# Patient Record
Sex: FEMALE | Race: OTHER
[De-identification: names, ages, dates, MRNs, and addresses within clinical notes are randomized per-mention and may not be internally consistent; named-entity substitution may affect disease eponyms.]

---

## 2017-06-06 ENCOUNTER — HOSPITAL ENCOUNTER (EMERGENCY)
Dept: HOSPITAL 59 - ER | Age: 57
Discharge: HOME | End: 2017-06-06
Payer: MEDICARE

## 2017-06-06 DIAGNOSIS — R51: Primary | ICD-10-CM

## 2017-06-06 DIAGNOSIS — E05.00: ICD-10-CM

## 2017-06-06 DIAGNOSIS — R68.83: ICD-10-CM

## 2017-06-06 DIAGNOSIS — Z86.19: ICD-10-CM

## 2017-06-06 DIAGNOSIS — Z11.59: ICD-10-CM

## 2017-06-06 DIAGNOSIS — Z00.00: ICD-10-CM

## 2017-06-06 DIAGNOSIS — R05: ICD-10-CM

## 2017-06-06 DIAGNOSIS — Z13.1: ICD-10-CM

## 2017-06-06 DIAGNOSIS — Z13.220: ICD-10-CM

## 2017-06-06 LAB
ALBUMIN SERPL-MCNC: 4.6 GM/DL (ref 3.5–5)
ALBUMIN/GLOB SERPL: 1.2 {RATIO} (ref 1.1–1.8)
ALP SERPL-CCNC: 164 U/L (ref 38–126)
ALT SERPL-CCNC: 58 U/L (ref 9–52)
ANION GAP SERPL CALC-SCNC: 5.7 MMOL/L (ref 7–16)
AST SERPL-CCNC: 58 U/L (ref 14–36)
BASOPHILS NFR BLD: 0.1 % (ref 0–6)
BILIRUB SERPL-MCNC: 0.81 MG/DL (ref 0.2–1.3)
BUN SERPL-MCNC: 10 MG/DL (ref 7–17)
CO2 SERPL-SCNC: 24.3 MMOL/L (ref 22–30)
CREAT SERPL-MCNC: 0.7 MG/DL (ref 0.52–1.04)
CRP SERPL-MCNC: < 0.5 MG/DL (ref 0–0.9)
EOSINOPHIL NFR BLD: 0.5 % (ref 0–6)
ERYTHROCYTE [DISTWIDTH] IN BLOOD BY AUTOMATED COUNT: 13.6 % (ref 11.5–14.5)
EST GLOMERULAR FILTRATION RATE: > 60 ML/MIN
GLOBULIN SER-MCNC: 4 GM/DL (ref 1.4–4.8)
GLUCOSE SERPL-MCNC: 114 MG/DL (ref 70–110)
GRANULOCYTES NFR BLD: 73.3 % (ref 47–80)
HCT VFR BLD CALC: 43.5 % (ref 35–47)
HGB BLD-MCNC: 15.2 GM/DL (ref 11.6–16)
LYMPHOCYTES NFR BLD AUTO: 19.2 % (ref 16–45)
MCH RBC QN AUTO: 29.9 PG (ref 27–33)
MCHC RBC AUTO-ENTMCNC: 34.9 G/DL (ref 32–36)
MCV RBC AUTO: 85.5 FL (ref 81–97)
MONOCYTES NFR BLD: 6.9 % (ref 0–9)
PLATELET # BLD: 154 K/UL (ref 130–400)
PMV BLD AUTO: 10.7 FL (ref 7.4–10.4)
PROT SERPL-MCNC: 8.6 GM/DL (ref 6.3–8.2)
RBC # BLD AUTO: 5.09 M/UL (ref 3.8–5.4)
WBC # BLD AUTO: 8 K/UL (ref 4.2–12.2)

## 2017-06-06 PROCEDURE — 99283 EMERGENCY DEPT VISIT LOW MDM: CPT

## 2017-06-06 PROCEDURE — 99284 EMERGENCY DEPT VISIT MOD MDM: CPT

## 2017-06-06 PROCEDURE — 85025 COMPLETE CBC W/AUTO DIFF WBC: CPT

## 2017-06-06 PROCEDURE — 96374 THER/PROPH/DIAG INJ IV PUSH: CPT

## 2017-06-06 PROCEDURE — 70450 CT HEAD/BRAIN W/O DYE: CPT

## 2017-06-06 PROCEDURE — 80053 COMPREHEN METABOLIC PANEL: CPT

## 2017-06-06 PROCEDURE — 86140 C-REACTIVE PROTEIN: CPT

## 2017-06-06 PROCEDURE — 96375 TX/PRO/DX INJ NEW DRUG ADDON: CPT

## 2017-06-06 NOTE — EMERGENCY DEPARTMENT RECORD
History of Present Illness





- General


Chief Complaint: Headache Migraine


Stated Complaint: HA & BODYACHES


Time Seen by Provider: 17 10:45


Source: Patient


Mode of Arrival: Ambulatory


Limitations: No limitations





- History of Present Illness


Initial Comments: 





57 yo female presents [facility] for evaluation of 

headache and body aches that began last night.  Patient does report similar 

headache symptoms previously, denies nausea/vomiting symptoms.  Patient reports 

that her symptoms came on gradually, reports congestion, sinus pain, and sinus 

pressure as well.  Patient denies neck stiffness and denies anticoagulation use.


MD Complaint: Headache


Onset/Timin


-: Hour(s)


Onset Description: Gradual


Location: Frontal, Occipital


Severity: Moderate


Severity scale (1-10): 10


Quality: Aching


Consistency: Constant


Improves With: Nothing


Worsens With: None


Other Symptoms: Chest pain, Cough


Treatments Prior to Arrival: None





- Related Data


 Home Medications











 Medication  Instructions  Recorded  Confirmed  Last Taken


 


Levothyroxine Sodium [Synthroid] 150 mcg PO DAILY 07/28/15 06/06/17 06/04/17











 Allergies











Allergy/AdvReac Type Severity Reaction Status Date / Time


 


aspirin Allergy Unknown PT UNSURE Unverified 17 10:19





   OF REACTION  


 


Penicillins Allergy Unknown PT UNSURE Unverified 17 10:19





   OF REACTION  














Travel Screening





- Travel/Exposure Within Last 30 Days


Have you traveled within the last 30 days?: No





- Travel/Exposure Within Last Year


Have you traveled outside the [place] in the last year?: No





- Additonal Travel Details


Have you been exposed to anyone with a communicable illness?: No





- Travel Symptoms


Symptom Screening: None





Review of Systems


Constitutional: Denies: Chills, Malaise, Night sweats


Eyes: Denies: Eye discharge, Eye pain


ENT: Reports: Congestion.  Denies: Ear pain, Epistaxis


Respiratory: Reports: Cough.  Denies: Dyspnea


Cardiovascular: Denies: Chest pain, Dyspnea on exertion


Endocrine: Denies: Fatigue, Heat or cold intolerance


Gastrointestinal: Denies: Abdominal pain, Nausea, Vomiting


Genitourinary: Denies: Dysuria, Frequency


Musculoskeletal: Denies: Arthralgia, Back pain, Gout, Joint swelling


Skin: Denies: Bruising, Change in color


Neurological: Reports: Headache.  Denies: Abnormal gait, Confusion, Seizure


Psychiatric: Denies: Anxiety


Hematological/Lymphatic: Denies: Anemia, Blood Clots





Past Medical History





- SOCIAL HISTORY


Smoking Status: Former smoker


Alcohol Use: None


Drug Use: Occassional


Drug Use Detail:: Marijuana





- RESPIRATORY


Hx Respiratory Disorders: No





- CARDIOVASCULAR


Hx Cardio Disorders: Yes


Hx Abnormal EKG: Yes





- NEURO


Hx Neuro Disorders: No





- GI


Hx GI Disorders: No





- 


Hx Genitourinary Disorders: No





- ENDOCRINE


Hx Endocrine Disorders: Yes


Hx Thyroid Disease: Yes (removed)





- MUSCULOSKELETAL


Hx Musculoskeletal Disorders: No





- PSYCH


Hx Psych Problems: Yes


Hx Anxiety: Yes


Hx Depression: Yes





- HEMATOLOGY/ONCOLOGY


Hx Hematology/Oncology Disorders: No





Family Medical History


Any Significant Family History?: No


Hx Cancer: Father


Hx Diabetes: Mother


Hx Liver Disease: Mother





Physical Exam





- General


General Appearance: Alert, Oriented x3, Cooperative, Moderate distress (patient 

is tearful on examination, anxious appearing)


Limitations: No limitations





- Head


Head exam: Atraumatic, Normocephalic, Normal inspection


Head exam detail: negative: Abrasion, Contusion, Rosales's sign, General 

tenderness, Hematoma, Laceration





- Eye


Eye exam: Normal appearance.  negative: Conjunctival injection, Periorbital 

swelling, Periorbital tenderness, Scleral icterus





- ENT


Ear exam: negative: Auricular hematoma, Auricular trauma


Nasal Exam: negative: Active bleeding, Discharge, Dried blood, Foreign body


Mouth exam: negative: Drooling, Laceration, Muffled voice, Tongue elevation





- Neck


Neck exam: Normal inspection.  negative: Meningismus, Tenderness





- Respiratory


Respiratory exam: Normal lung sounds bilaterally.  negative: Rales, Respiratory 

distress, Rhonchi, Stridor





- Cardiovascular


Cardiovascular Exam: Regular rate, Normal rhythm, Normal heart sounds





- GI/Abdominal


GI/Abdominal exam: Soft.  negative: Rebound, Rigid, Tenderness





- Rectal


Rectal exam: Deferred





- 


 exam: Deferred





- Extremities


Extremities exam: Normal inspection.  negative: Calf tenderness, Pedal edema, 

Tenderness





- Back


Back exam: Denies: CVA tenderness (R), CVA tenderness (L)





- Neurological


Neurological exam: Alert, Normal gait, Oriented X3





- Psychiatric


Psychiatric exam: Normal affect, Normal mood





- Skin


Skin exam: Normal color.  negative: Abrasion


Type of lesion: negative: abrasion





Course





 Vital Signs











  17





  10:46


 


Temperature 98.0 F


 


Pulse Rate 92 H


 


Respiratory 18





Rate 


 


Blood Pressure 154/85


 


Pulse Ox 97














- Reevaluation(s)


Reevaluation #1: 





17 11:06


Will administer migraine therapy, obtain laboratory studies, and CT imaging of 

the brain and reassess.  Patient agrees with plan as discussed.


Reevaluation #2: 





17 11:51


Labs reviewed, CRP < 0.5, WBC normal, AST/ALT/Alk phos are minimally elevated 

and c/w hepatitis C history.


CT Brain: NO acute process.


Reevaluation #3: 





17 12:11


Patient reassessed and reports that her headache symptoms are down to "5/10 

from a 100/10".  Given the patient's rapid improvement with non-narcotic therapy

, lack of fever, and no meningeal signs, patient is in agreement that LP would 

not be of benefit.  Patient was instructed to return to ED for any worsening of 

her symptoms.





Medical Decision Making





- Lab Data


Result diagrams: 


 17 11:09





 17 11:09





Disposition


Disposition: Discharge


Clinical Impression: 


Headache


Qualifiers:


 Headache type: unspecified Headache chronicity pattern: acute headache 

Intractability: not intractable Qualified Code(s): R51 - Headache





Disposition: Home, Self-Care


Condition: (2) Stable


Instructions:  Acute Headache (ED)


Additional Instructions: 


Return to ED if your symptoms worsen or if you have any concerns.


Follow-up with your family doctor in 1-3 days as directed.


Forms:  Patient Portal Access


Time of Disposition: 12:14

## 2017-06-08 NOTE — CT SCAN REPORT
EXAM:  CT OF THE BRAIN



HISTORY:  HEADACHES.  



TECHNIQUE:  CT of the brain without contrast was obtained.  



Comparison:  Prior CT of the brain from 7/28/15.  



FINDINGS:  The globes are intact.  Mucosal thickening of the ethmoid air cells.
  No displaced or depressed skull fracture.  No intra or extraaxial hemorrhage.
  CT is limited for evaluation of acute infarct.  No CT evidence for large or 
territorial acute infarct.  No mass or midline shift.  



IMPRESSION:  

NEGATIVE FOR ACUTE INTRACRANIAL ABNORMALITY.  



JOB NUMBER:  [id number]
[facility]

## 2018-09-20 ENCOUNTER — HOSPITAL ENCOUNTER (EMERGENCY)
Dept: HOSPITAL 59 - ER | Age: 58
Discharge: HOME | End: 2018-09-20
Payer: MEDICARE

## 2018-09-20 DIAGNOSIS — R11.2: ICD-10-CM

## 2018-09-20 DIAGNOSIS — R06.02: ICD-10-CM

## 2018-09-20 DIAGNOSIS — R53.1: ICD-10-CM

## 2018-09-20 DIAGNOSIS — Z87.891: ICD-10-CM

## 2018-09-20 DIAGNOSIS — G44.40: Primary | ICD-10-CM

## 2018-09-20 DIAGNOSIS — T50.995A: ICD-10-CM

## 2018-09-20 DIAGNOSIS — R05: ICD-10-CM

## 2018-09-20 LAB
ALBUMIN SERPL-MCNC: 4 G/DL (ref 4–5)
ALP SERPL-CCNC: 107 U/L (ref 35–104)
ALT SERPL-CCNC: < 5 U/L (ref ?–33)
ANION GAP SERPL CALC-SCNC: 16 MMOL/L (ref 7–16)
AST SERPL-CCNC: 21 U/L (ref 10–35)
BASOPHILS NFR BLD: 0.2 % (ref 0–6)
BILIRUB DIRECT SERPL-MCNC: < 0.2 MG/DL (ref 0–0.3)
BILIRUB SERPL-MCNC: 0.5 MG/DL (ref 0.2–1)
BUN SERPL-MCNC: 9 MG/DL (ref 8–23)
CO2 SERPL-SCNC: 23 MMOL/L (ref 22–29)
CREAT SERPL-MCNC: 0.6 MG/DL (ref 0.5–0.9)
EOSINOPHIL NFR BLD: 1.2 % (ref 0–6)
ERYTHROCYTE [DISTWIDTH] IN BLOOD BY AUTOMATED COUNT: 12.8 % (ref 11.5–14.5)
EST GLOMERULAR FILTRATION RATE: > 60 ML/MIN
GLUCOSE SERPL-MCNC: 114 MG/DL (ref 74–109)
GRANULOCYTES NFR BLD: 59.2 % (ref 47–80)
HCT VFR BLD CALC: 40.1 % (ref 35–47)
HGB BLD-MCNC: 13.7 GM/DL (ref 11.6–16)
LYMPHOCYTES NFR BLD AUTO: 32.2 % (ref 16–45)
MCH RBC QN AUTO: 29.5 PG (ref 27–33)
MCHC RBC AUTO-ENTMCNC: 34.2 G/DL (ref 32–36)
MCV RBC AUTO: 86.2 FL (ref 81–97)
MONOCYTES NFR BLD: 7.2 % (ref 0–9)
PLATELET # BLD: 169 K/UL (ref 130–400)
PMV BLD AUTO: 10.4 FL (ref 7.4–10.4)
PROT SERPL-MCNC: 7.4 G/DL (ref 6.6–8.7)
RBC # BLD AUTO: 4.65 M/UL (ref 3.8–5.4)
WBC # BLD AUTO: 6.5 K/UL (ref 4.2–12.2)

## 2018-09-20 PROCEDURE — 96361 HYDRATE IV INFUSION ADD-ON: CPT

## 2018-09-20 PROCEDURE — 96375 TX/PRO/DX INJ NEW DRUG ADDON: CPT

## 2018-09-20 PROCEDURE — 96374 THER/PROPH/DIAG INJ IV PUSH: CPT

## 2018-09-20 PROCEDURE — 85025 COMPLETE CBC W/AUTO DIFF WBC: CPT

## 2018-09-20 PROCEDURE — 71046 X-RAY EXAM CHEST 2 VIEWS: CPT

## 2018-09-20 PROCEDURE — 99284 EMERGENCY DEPT VISIT MOD MDM: CPT

## 2018-09-20 PROCEDURE — 80076 HEPATIC FUNCTION PANEL: CPT

## 2018-09-20 PROCEDURE — 80048 BASIC METABOLIC PNL TOTAL CA: CPT

## 2018-09-20 NOTE — EMERGENCY DEPARTMENT RECORD
History of Present Illness





- General


Chief Complaint: Headache Migraine


Stated Complaint: HEADACHE


Time Seen by Provider: 18 07:09


Source: Patient


Mode of Arrival: Ambulatory


Limitations: No limitations





- History of Present Illness


Initial Comments: 


The patient is here due to a 3-4 day hx of not feeling well. She started 

Harvoni 5 days ago and then 4 days ago developed a mild frontal HA. Since the 

HA has gradually worsened and now has become much more severe. She has had 

nausea and some vomiting and also did feel SOB last night with a cough. The 

patient also feels like her gums are swollen. There has been no fever, chills, 

CP, AP, or visual changes. The patient has had similar HA's in the past and has 

been to the ER for them also.





MD Complaint: Headache


Onset/Timin


-: Days(s)


Onset Description: Gradual


Location: Facial, Frontal


Severity scale (1-10): 10


Quality: Aching


Consistency: Constant


Improves With: Nothing


Associated Symptoms: Other


Treatments Prior to Arrival: Ibuprofen





- Related Data


 Home Medications











 Medication  Instructions  Recorded  Confirmed  Last Taken


 


Ledipasvir/Sofosbuvir [Harvoni 1 each PO DAILY 18





 mg Tablet]    








 Previous Rx's











 Medication  Instructions  Recorded


 


Ondansetron [Zofran Odt] 4 mg SL .Q4-6H PRN #12 tab.rapdis 18











 Allergies











Allergy/AdvReac Type Severity Reaction Status Date / Time


 


aspirin Allergy Unknown PT UNSURE Unverified 18 10:10





   OF REACTION  


 


Penicillins Allergy Unknown PT UNSURE Unverified 18 10:10





   OF REACTION  














Travel Screening





- Travel/Exposure Within Last 30 Days


Have you traveled within the last 30 days?: No





- Travel/Exposure Within Last Year


Have you traveled outside the [place] in the last year?: No





- Additonal Travel Details


Have you been exposed to anyone with a communicable illness?: No





- Travel Symptoms


Symptom Screening: None





Review of Systems


Constitutional: Reports: Malaise.  Denies: Chills, Fever


Eyes: Denies: Eye discharge


ENT: Denies: Congestion


Respiratory: Denies: Cough, Dyspnea


Cardiovascular: Denies: Arrhythmia, Chest pain


Endocrine: Reports: Fatigue


Gastrointestinal: Reports: Nausea


Genitourinary: Denies: Dysuria


Musculoskeletal: Denies: Arthralgia





Past Medical History





- SOCIAL HISTORY


Smoking Status: Former smoker


Alcohol Use: None


Drug Use: Rare


Drug Use Detail:: Marijuana





- RESPIRATORY


Hx Respiratory Disorders: Yes


Hx COPD: Yes





- CARDIOVASCULAR


Hx Cardio Disorders: No


Hx Abnormal EKG: Yes





- NEURO


Hx Neuro Disorders: Yes


Hx Headaches: Yes





- GI


Hx GI Disorders: Yes


Hx Hepatitis/Jaundice: Yes (Hep C)





- 


Hx Genitourinary Disorders: No





- ENDOCRINE


Hx Endocrine Disorders: Yes


Hx Thyroid Disease: Yes (removed)





- MUSCULOSKELETAL


Hx Musculoskeletal Disorders: Yes


Hx Osteoporosis: Yes





- PSYCH


Hx Psych Problems: Yes


Hx Anxiety: Yes


Hx Depression: Yes





- HEMATOLOGY/ONCOLOGY


Hx Hematology/Oncology Disorders: No





Family Medical History


Any Significant Family History?: No


Hx Cancer: Father


Hx Diabetes: Mother


Hx Liver Disease: Mother





Physical Exam





- General


General Appearance: Alert, Oriented x3, Cooperative, No acute distress





- Head


Head exam: Atraumatic, Normocephalic, Normal inspection





- Eye


Eye exam: Normal appearance, PERRL, EOMI





- ENT


Throat exam: Normal inspection.  negative: Tonsillar erythema, Tonsillar exudate





- Neck


Neck exam: Normal inspection, Full ROM.  negative: Meningismus (The neck is 

very supple.), Tenderness





- Respiratory


Respiratory exam: Normal lung sounds bilaterally.  negative: Respiratory 

distress





- Cardiovascular


Cardiovascular Exam: Regular rate, Normal rhythm, Normal heart sounds





- GI/Abdominal


GI/Abdominal exam: Soft, Normal bowel sounds.  negative: Tenderness





- Extremities


Extremities exam: Normal inspection, Full ROM, Normal capillary refill.  

negative: Tenderness





- Neurological


Neurological exam: Alert, Normal gait, Oriented X3.  negative: Abnormal gait, 

Motor sensory deficit





Course





 Vital Signs











  18





  07:08


 


Temperature 98.4 F


 


Pulse Rate [ 92 H





Pulse Ox Probe] 


 


Respiratory 24





Rate 


 


Blood Pressure 144/95





[Left Arm] 


 


Pulse Ox 97














- Reevaluation(s)


Reevaluation #1: 


The patient is doing a lot better at this time and her HA has resolved. I did 

discuss what I feel is the cause of her symptoms and feel very strongly that it 

is the Harvoni. The first 4 common side effects of Harvoni are weakness, 

headache, nausea and cough and the patient is here due to all 4 of those 

issues. She is to stop the medicine and call her specialist today.


18 08:24








Medical Decision Making





- Data Complexity


MDM Data: Labs Ordered and/or Reviewed, X-Ray Ordered and/or Reviewed





- Lab Data


Result diagrams: 


 18 07:35





 18 07:35





- Radiology Data


Radiology results: Report reviewed (CXR: Neg)





Disposition


Disposition: Discharge


Clinical Impression: 


 Medication side effects





Disposition: Home, Self-Care


Condition: (2) Stable


Instructions:  Acute Headache (ED)


Additional Instructions: 


Please stop the Harvoni and use Zofran for nausea. Please call your Specialist 

today for further instructions regarding the medicines. Return to the ER for 

any worsening symptoms.


Prescriptions: 


Ondansetron [Zofran Odt] 4 mg SL .Q4-6H PRN #12 tab.rapdis


 PRN Reason: Nausea


Forms:  Patient Portal Access


Time of Disposition: 08:27





Quality





- Quality Measures


Quality Measures: N/A





- Blood Pressure Screening


View Details: Yes


Does Patient Have Any of the Following: No


Blood Pressure Classification: Pre-Hypertensive BP Reading


Systolic Measurement: 117


Diastolic Measurement: 80


Screening for High Blood Pressure: < Pre-Hypertensive BP, F/U Documented > [

]


Pre-Hypertensive Follow-up Interventions: Referral to alternative/primary care 

provider.

## 2018-09-22 NOTE — RADIOLOGY REPORT
EXAM:  CHEST, TWO VIEWS



HISTORY:  COUGH.  



TECHNIQUE:  Two views of the chest were obtained.  



Comparison:  Two view chest radiographic examination dated 10/17/16.  



FINDINGS:  The heart is not enlarged and the pulmonary vasculature is 
nondilated.  The lungs and pleural spaces are grossly clear with the exception 
of minimal biapical pleural and parenchymal scarring.  There are mild 
degenerative end plate changes scattered within the visualized spine.  



IMPRESSION:  

 NO RADIOGRAPHIC EVIDENCE OF ACUTE CARDIOPULMONARY DISEASE WITHOUT SIGNIFICANT 
CHANGE SINCE 2/17/06.  



JOB NUMBER:  [id number]
[facility]

## 2019-01-01 ENCOUNTER — HOSPITAL ENCOUNTER (INPATIENT)
Dept: HOSPITAL 59 - ER | Age: 59
LOS: 2 days | Discharge: HOME | End: 2019-01-03
Attending: INTERNAL MEDICINE | Admitting: INTERNAL MEDICINE
Payer: MEDICARE

## 2019-01-01 DIAGNOSIS — B19.20: ICD-10-CM

## 2019-01-01 DIAGNOSIS — Z90.89: ICD-10-CM
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
## 2019-01-01 DIAGNOSIS — R55: Primary | ICD-10-CM

## 2019-01-01 DIAGNOSIS — S70.12XA: ICD-10-CM

## 2019-01-01 DIAGNOSIS — J44.9: ICD-10-CM

## 2019-01-01 DIAGNOSIS — E03.9: ICD-10-CM

## 2019-01-01 DIAGNOSIS — Z87.891: ICD-10-CM

## 2019-01-01 DIAGNOSIS — S00.93XA: ICD-10-CM

## 2019-01-01 DIAGNOSIS — S40.022A: ICD-10-CM

## 2019-01-01 LAB
ALBUMIN SERPL-MCNC: 3.8 G/DL (ref 4–5)
ALP SERPL-CCNC: 103 U/L (ref 35–104)
ALT SERPL-CCNC: 18 U/L (ref ?–33)
APPEARANCE UR: CLEAR
AST SERPL-CCNC: 23 U/L (ref 10–35)
BARBITURATE SCREEN URINE: NOT DETECTED
BENZODIAZEPINE SCREEN URINE: NOT DETECTED
BILIRUB DIRECT SERPL-MCNC: < 0.2 MG/DL (ref 0–0.3)
BILIRUB SERPL-MCNC: 0.4 MG/DL (ref 0.2–1)
BILIRUB UR-MCNC: NEGATIVE MG/DL
CARDIOLIPIN IGM SER IA-ACNC: NOT DETECTED
CARDIOLIPIN IGM SER IA-ACNC: NOT DETECTED
COLOR UR: YELLOW
EOSINOPHIL NFR BLD: 1 % (ref 0–6)
ERYTHROCYTE [DISTWIDTH] IN BLOOD BY AUTOMATED COUNT: 12.8 % (ref 11.5–14.5)
GLUCOSE UR STRIP-MCNC: NEGATIVE MG/DL
HCT VFR BLD CALC: 39.6 % (ref 35–47)
HGB BLD-MCNC: 13.4 GM/DL (ref 11.6–16)
KETONES UR QL STRIP: NEGATIVE
LYMPHOCYTES NFR BLD: 56 % (ref 16–45)
MCH RBC QN AUTO: 28.7 PG (ref 27–33)
MCHC RBC AUTO-ENTMCNC: 33.8 G/DL (ref 32–36)
MCV RBC AUTO: 84.8 FL (ref 81–97)
METHADONE SCREEN URINE: NOT DETECTED
MONOCYTES NFR BLD: 6 % (ref 0–9)
NEUTROPHILS NFR BLD AUTO: 37 % (ref 47–80)
NITRITE UR QL STRIP: NEGATIVE
OPIATE SCREEN URINE: NOT DETECTED
PF4 HEPARIN CMPLX AB SER-ACNC: NOT DETECTED
PF4 HEPARIN CMPLX AB SER-ACNC: NOT DETECTED
PHENCYCLIDINE SCREEN URINE: NOT DETECTED
PLATELET # BLD EST: NORMAL 10*3/UL
PLATELET # BLD: 198 K/UL (ref 130–400)
PMV BLD AUTO: 11 FL (ref 7.4–10.4)
PROPOXYPHENE SCREEN URINE: NOT DETECTED
PROT SERPL-MCNC: 7.3 G/DL (ref 6.6–8.7)
PROT UR QL STRIP: NEGATIVE
RBC # BLD AUTO: 4.67 M/UL (ref 3.8–5.4)
RBC # UR STRIP: NEGATIVE /UL
RBC MORPH BLD: NORMAL
THC SCREEN URINE: NOT DETECTED
TRICYCLIC ANTIDEPRESSANT SCRN: NOT DETECTED
URINE LEUKOCYTE ESTERASE: NEGATIVE
UROBILINOGEN UR STRIP-ACNC: 1 E.U./DL (ref 0.2–1)
WBC # BLD AUTO: 6.6 K/UL (ref 4.2–12.2)

## 2019-01-01 PROCEDURE — 80305 DRUG TEST PRSMV DIR OPT OBS: CPT

## 2019-01-01 PROCEDURE — 84443 ASSAY THYROID STIM HORMONE: CPT

## 2019-01-01 PROCEDURE — 73030 X-RAY EXAM OF SHOULDER: CPT

## 2019-01-01 PROCEDURE — 99239 HOSP IP/OBS DSCHRG MGMT >30: CPT

## 2019-01-01 PROCEDURE — 90732 PPSV23 VACC 2 YRS+ SUBQ/IM: CPT

## 2019-01-01 PROCEDURE — 85027 COMPLETE CBC AUTOMATED: CPT

## 2019-01-01 PROCEDURE — 80048 BASIC METABOLIC PNL TOTAL CA: CPT

## 2019-01-01 PROCEDURE — 82948 REAGENT STRIP/BLOOD GLUCOSE: CPT

## 2019-01-01 PROCEDURE — 99285 EMERGENCY DEPT VISIT HI MDM: CPT

## 2019-01-01 PROCEDURE — 72125 CT NECK SPINE W/O DYE: CPT

## 2019-01-01 PROCEDURE — 84436 ASSAY OF TOTAL THYROXINE: CPT

## 2019-01-01 PROCEDURE — 84484 ASSAY OF TROPONIN QUANT: CPT

## 2019-01-01 PROCEDURE — 96374 THER/PROPH/DIAG INJ IV PUSH: CPT

## 2019-01-01 PROCEDURE — 36416 COLLJ CAPILLARY BLOOD SPEC: CPT

## 2019-01-01 PROCEDURE — 85025 COMPLETE CBC W/AUTO DIFF WBC: CPT

## 2019-01-01 PROCEDURE — 84480 ASSAY TRIIODOTHYRONINE (T3): CPT

## 2019-01-01 PROCEDURE — 94640 AIRWAY INHALATION TREATMENT: CPT

## 2019-01-01 PROCEDURE — 99223 1ST HOSP IP/OBS HIGH 75: CPT

## 2019-01-01 PROCEDURE — 80076 HEPATIC FUNCTION PANEL: CPT

## 2019-01-01 PROCEDURE — 70450 CT HEAD/BRAIN W/O DYE: CPT

## 2019-01-01 PROCEDURE — 73552 X-RAY EXAM OF FEMUR 2/>: CPT

## 2019-01-01 PROCEDURE — 71101 X-RAY EXAM UNILAT RIBS/CHEST: CPT

## 2019-01-01 PROCEDURE — 85379 FIBRIN DEGRADATION QUANT: CPT

## 2019-01-01 PROCEDURE — 93010 ELECTROCARDIOGRAM REPORT: CPT

## 2019-01-01 PROCEDURE — 93306 TTE W/DOPPLER COMPLETE: CPT

## 2019-01-01 PROCEDURE — 83605 ASSAY OF LACTIC ACID: CPT

## 2019-01-01 PROCEDURE — 93005 ELECTROCARDIOGRAM TRACING: CPT

## 2019-01-01 PROCEDURE — 93880 EXTRACRANIAL BILAT STUDY: CPT

## 2019-01-01 PROCEDURE — 80053 COMPREHEN METABOLIC PANEL: CPT

## 2019-01-01 PROCEDURE — 83036 HEMOGLOBIN GLYCOSYLATED A1C: CPT

## 2019-01-01 PROCEDURE — 81003 URINALYSIS AUTO W/O SCOPE: CPT

## 2019-01-01 RX ADMIN — ACETAMINOPHEN PRN MG: 325 TABLET, FILM COATED ORAL at 22:27

## 2019-01-01 NOTE — EMERGENCY DEPARTMENT RECORD
History of Present Illness





- General


Chief Complaint: Dizziness


Stated Complaint: FELL,DIZZY, SHAKY


Time Seen by Provider: 19 18:06


Source: Patient


Mode of Arrival: Ambulatory





- History of Present Illness


Initial Comments: 





The patient states that she had two episodes of syncope since last evening 

twice.  FIRST syncope she was lightheaded, layed down for a while, got up to go 

to bathroom and next thing she knew she  woke up on bathroom floor. She states 

she it her left head left shoulder and left hip/thigh and they are painful. 


SECOND time she had slept all night in bed, went home around 1100am today, ate 

toast and egg, and when she got up to to to bathroom she woke up again on the 

floor.  She denies other mproblems such as chest pain, Kelsey, abdominal pain 

nausea vomiting diarrhea, abdominal pain.


MD Complaint: Other (syncope)


Onset/Timin


-: Days(s)


Timing: Sudden onset, Waxing/waning


Description: Near-syncope


History of Same: Yes (related to thyroid issues )


History of Trauma: No


Severity: Moderate





- Liliana Coma Scale


Eye Response: (4) Open spontaneously


Motor Response: (6) Obeys commands


Verbal Response: (5) Oriented


[place] Total: 15





- Related Data


 Allergies











Allergy/AdvReac Type Severity Reaction Status Date / Time


 


aspirin Allergy Unknown PT UNSURE Verified 19 17:56





   OF REACTION  


 


Penicillins Allergy Unknown PT UNSURE Verified 19 17:56





   OF REACTION  














Travel Screening





- Travel/Exposure Within Last 30 Days


Have you traveled within the last 30 days?: No





- Travel/Exposure Within Last Year


Have you traveled outside the [place] in the last year?: No





- Additonal Travel Details


Have you been exposed to anyone with a communicable illness?: No





- Travel Symptoms


Symptom Screening: None





Review of Systems


Reviewed: No additional complaints except as noted below


Constitutional: Reports: As per HPI.  Denies: Chills, Fever, Malaise, Night 

sweats, Weakness, Weight change


Eyes: Reports: As per HPI.  Denies: Eye discharge, Eye pain, Photophobia, 

Vision change


ENT: Reports: As per HPI.  Denies: Congestion, Dental pain, Ear pain, Epistaxis

, Hearing loss, Throat pain


Respiratory: Reports: As per HPI.  Denies: Cough, Dyspnea, Hemoptysis, Stridor, 

Wheezes


Cardiovascular: Reports: As per HPI.  Denies: Arrhythmia, Chest pain, Dyspnea 

on exertion, Edema, Murmurs, Orthopnea, Palpitations, Paroxysmal nocturnal 

dyspnea, Rheumatic Fever, Syncope


Endocrine: Reports: As per HPI.  Denies: Fatigue, Heat or cold intolerance, 

Polydipsia, Polyuria


Gastrointestinal: Reports: As per HPI.  Denies: Abdominal pain, Constipation, 

Diarrhea, Hematemesis, Hematochezia, Melena, Nausea, Vomiting


Genitourinary: Reports: As per HPI.  Denies: Abnormal menses, Discharge, 

Dyspareunia, Dysuria, Frequency, Hematuria, Incontinence, Retention, Urgency


Musculoskeletal: Reports: As per HPI.  Denies: Arthralgia, Back pain, Gout, 

Joint swelling, Myalgia, Neck pain


Skin: Reports: As per HPI.  Denies: Bruising, Change in color, Change in hair/

nails, Lesions, Pruritus, Rash


Neurological: Reports: As per HPI.  Denies: Abnormal gait, Confusion, Headache, 

Numbness, Paresthesias, Seizure, Tingling, Tremors, Vertigo, Weakness


Psychiatric: Reports: As per HPI.  Denies: Anxiety, Auditory hallucinations, 

Depression, Homicidal thoughts, Suicidal thoughts, Visual hallucinations


Hematological/Lymphatic: Reports: As per HPI.  Denies: Anemia, Blood Clots, 

Easy bleeding, Easy bruising, Swollen glands





Past Medical History





- SOCIAL HISTORY


Smoking Status: Former smoker


Alcohol Use: None


Drug Use: None, Rare


Drug Use Detail:: Marijuana





- RESPIRATORY


Hx Respiratory Disorders: Yes


Hx COPD: Yes





- CARDIOVASCULAR


Hx Cardio Disorders: No


Hx Abnormal EKG: Yes


Comment:: low HR





- NEURO


Hx Neuro Disorders: Yes


Hx Headaches: Yes





- GI


Hx GI Disorders: Yes


Hx Hepatitis/Jaundice: Yes (Hep C)





- 


Hx Genitourinary Disorders: No





- ENDOCRINE


Hx Endocrine Disorders: Yes


Hx Thyroid Disease: Yes (removed)





- MUSCULOSKELETAL


Hx Musculoskeletal Disorders: Yes


Hx Osteoporosis: Yes





- PSYCH


Hx Psych Problems: Yes


Hx Anxiety: Yes


Hx Depression: Yes





- HEMATOLOGY/ONCOLOGY


Hx Hematology/Oncology Disorders: No





Family Medical History


Any Significant Family History?: Yes


Hx Cancer: Father


Hx Diabetes: Mother


Hx Liver Disease: Mother





Physical Exam





- General


General Appearance: Alert, Oriented x3, Cooperative, Mild distress





- Head


Head exam: Normal inspection


Head exam detail: Contusion (diffusely over left side of head)





- Eye


Eye exam: Normal appearance, PERRL, EOMI.  negative: Conjunctival injection, 

Nystagmus


Pupils: Normal accommodation





- ENT


ENT exam: Normal exam, Mucous membranes moist, Normal external ear exam, Normal 

orophraynx, TM's normal bilaterally


Ear exam: Normal external inspection.  negative: External canal tenderness


Nasal Exam: Normal inspection.  negative: Discharge, Sinus tenderness


Mouth exam: Normal external inspection, Tongue normal


Teeth exam: Normal inspection.  negative: Dental caries


Throat exam: Normal inspection.  negative: Tonsillar erythema, Tonsillar exudate





- Neck


Neck exam: Normal inspection, Full ROM.  negative: Lymphadenopathy, Meningismus

, Tenderness





- Respiratory


Respiratory exam: Normal lung sounds bilaterally.  negative: Respiratory 

distress





- Cardiovascular


Cardiovascular Exam: Regular rate, Normal rhythm, Normal heart sounds





- GI/Abdominal


GI/Abdominal exam: Soft, Normal bowel sounds.  negative: Tenderness





- Rectal


Rectal exam: Deferred





- 


 exam: Deferred





- Extremities


Extremities exam: Normal inspection, Full ROM, Normal capillary refill.  

negative: Calf tenderness, Pedal edema, Tenderness





- Back


Back exam: Reports: Normal inspection, Full ROM.  Denies: CVA tenderness (R), 

CVA tenderness (L), Muscle spasm, Rash noted, Tenderness





- Neurological


Neurological exam: Alert, CN II-XII intact, Normal gait, Oriented X3, Reflexes 

normal.  negative: Abnormal gait, Altered, Motor sensory deficit





- Psychiatric


Psychiatric exam: Normal affect, Normal mood





- Skin


Skin exam: Dry, Intact, Normal color, Warm





Course





 Vital Signs











  19





  17:47


 


Temperature 97.9 F


 


Pulse Rate 86


 


Respiratory 18





Rate 


 


Blood Pressure 121/78


 


Pulse Ox 98














- Reevaluation(s)


Reevaluation #1: 





19 21:25


Patis's test results discussed with her,  and she agrees to admission here for 

syncope workup.





Medical Decision Making





- Management Options


MDM Management: Additional Work-up Planned (e.g. ADM/Transfer/OP Study) (

Admission fo syncope)





- Data Complexity


MDM Data: Labs Ordered and/or Reviewed, X-Ray Ordered and/or Reviewed (All 

scans and xrays negative per radiologist.), EKG Ordered and/or Reviewed





- Lab Data


Result diagrams: 


 19 18:40








- EKG Data


-: EKG Interpreted by Me


EKG: No Acute Changes





Disposition


Forms:  Patient Portal Access





Quality





- Quality Measures


Quality Measures: N/A





- Blood Pressure Screening


Does Patient Have Any of the Following: No


Blood Pressure Classification: Pre-Hypertensive BP Reading


Systolic Measurement: 121


Diastolic Measurement: 78


Screening for High Blood Pressure: < Normal BP, F/U Not Required > []

## 2019-01-02 LAB
ALBUMIN SERPL-MCNC: 3.4 G/DL (ref 4–5)
ALBUMIN/GLOB SERPL: 1.1 {RATIO} (ref 1.1–1.8)
ALP SERPL-CCNC: 91 U/L (ref 35–104)
ALT SERPL-CCNC: 19 U/L (ref ?–33)
ANION GAP SERPL CALC-SCNC: 11 MMOL/L (ref 7–16)
AST SERPL-CCNC: 20 U/L (ref 10–35)
BILIRUB SERPL-MCNC: 0.5 MG/DL (ref 0.2–1)
BUN SERPL-MCNC: 13 MG/DL (ref 8–23)
CO2 SERPL-SCNC: 24 MMOL/L (ref 22–29)
CREAT SERPL-MCNC: 0.6 MG/DL (ref 0.5–0.9)
ERYTHROCYTE [DISTWIDTH] IN BLOOD BY AUTOMATED COUNT: 12.5 % (ref 11.5–14.5)
EST GLOMERULAR FILTRATION RATE: > 60 ML/MIN
GLOBULIN SER-MCNC: 3.2 GM/DL (ref 1.4–4.8)
GLUCOSE SERPL-MCNC: 109 MG/DL (ref 74–109)
HCT VFR BLD CALC: 37.9 % (ref 35–47)
HGB BLD-MCNC: 12.9 GM/DL (ref 11.6–16)
LYMPHOCYTES NFR BLD: 64 % (ref 16–45)
MCH RBC QN AUTO: 28.7 PG (ref 27–33)
MCHC RBC AUTO-ENTMCNC: 34 G/DL (ref 32–36)
MCV RBC AUTO: 84.2 FL (ref 81–97)
MONOCYTES NFR BLD: 11 % (ref 0–9)
NEUTROPHILS NFR BLD AUTO: 25 % (ref 47–80)
PLATELET # BLD EST: NORMAL 10*3/UL
PLATELET # BLD: 181 K/UL (ref 130–400)
PMV BLD AUTO: 10.3 FL (ref 7.4–10.4)
PROT SERPL-MCNC: 6.6 G/DL (ref 6.6–8.7)
RBC # BLD AUTO: 4.5 M/UL (ref 3.8–5.4)
RBC MORPH BLD: NORMAL
WBC # BLD AUTO: 5.1 K/UL (ref 4.2–12.2)

## 2019-01-02 RX ADMIN — ACETAMINOPHEN PRN MG: 325 TABLET, FILM COATED ORAL at 12:18

## 2019-01-02 RX ADMIN — ALBUTEROL SULFATE PRN PUFF: 90 AEROSOL, METERED RESPIRATORY (INHALATION) at 22:33

## 2019-01-02 RX ADMIN — ENOXAPARIN SODIUM SCH MG: 40 INJECTION SUBCUTANEOUS at 12:18

## 2019-01-02 RX ADMIN — ACETAMINOPHEN PRN MG: 325 TABLET, FILM COATED ORAL at 08:32

## 2019-01-02 RX ADMIN — ALBUTEROL SULFATE PRN PUFF: 90 AEROSOL, METERED RESPIRATORY (INHALATION) at 08:24

## 2019-01-02 RX ADMIN — DIPHENHYDRAMINE HYDROCHLORIDE PRN MG: 25 CAPSULE ORAL at 12:18

## 2019-01-02 NOTE — CT SCAN REPORT
EXAM:  CT SCAN OF THE CERVICAL SPINE WITHOUT CONTRAST 



HISTORY:  SYNCOPE AND FALL.  LEFT SIDED NECK PAIN.  



TECHNIQUE:  Standard CT imaging of the cervical spine was performed in the 
axial plane without contrast.  Additional coronal and sagittal reformatted 
images were also performed.  



Comparison:  None.  



Encounter:  Initial.



FINDINGS:  The patient is immobilized in a cervical collar.  There is normal 
cervical alignment.  The craniocervical and cervicothoracic junctions are 
normal.  There is no acute fracture, subluxation, or prevertebral soft tissue 
swelling.  There is very minor end plate degenerative change at the C5-C6 
level.  There is no central canal stenosis or significant neural foraminal 
narrowing.  The neck soft tissues and lung apices appear normal.  



IMPRESSION:  

NO ACUTE CERVICAL SPINE PATHOLOGY.  



JOB NUMBER:  [id number]
[facility]

## 2019-01-02 NOTE — CARDIOLOGY CONSULT
DATE: 01/02/2019



ADMISSION DATE: 01/01/2019



REASON FOR CONSULTATION: SYNCOPE.



HISTORY: Ms. Chavez is 58 years old, who presented to [facility] on 01/01/2019 for two episodes of syncope. She states the first episode 
occurred on 12/24/2018, when she felt dizzy and woke up on the bathroom floor. 
She reported no other significant complaints. After regaining consciousness, 
she did have one episode of emesis. The second episode occurred on 01/01/2019 
with, again, an episode of dizziness after breakfast. She denied angina, 
palpitations, TIA, or previous history of syncope. Her ECG on arrival 
demonstrated sinus rhythm with no ST/T abnormalities. Her troponins were 
negative during her hospitalization. Carotid ultrasound were ordered and 
demonstrated no significant bilateral carotid disease.  



PAST MEDICAL HISTORY: Includes hepatitis C, anxiety/depression, hypothyroidism, 
COPD. 



PAST SURGICAL HISTORY: She has no significant surgical history. 



MEDICATIONS AT HOME INCLUDE: Levothyroxine 225 mcg daily.



ALLERGIES: ASPIRIN. PENICILLIN. She states she may have hives from these 
medications.



SOCIAL HISTORY: She lives at home. She denies alcohol or tobacco use. 



PHYSICAL EXAM: 

GENERAL: She is afebrile. 

VITAL SIGNS: Blood pressure 118/57. Pulse 75. Respirations 16. 

LUNGS: Clear.

CARDIAC: Cardiac exam was normal.

ABDOMEN: Soft.

EXTREMITIES: Extremities reveal no edema. 



LABORATORY PROFILE: Her white count is 5.1. Hemoglobin 12.9. Platelets 181,000. 
Sodium 143. Potassium 3.6. BUN 13. Creatinine 0.6. Blood sugar 109. Her 
troponins have been negative. 



IMPRESSION/PLAN:



Ms. Chavez had two episodes of syncope that are likely vasovagal in nature. 
ECG and enzymes were negative.Carotid ultrasound was negative. Echocardiogram 
was performed today with an ejection fraction of 55% to 60% with mild mitral 
and tricuspid regurgitation. She can be discharged for outpatient follow-up. 



Thank you for this consultation.



JOB NUMBER:  [id number]
[facility]

## 2019-01-02 NOTE — RADIOLOGY REPORT
EXAM:  LEFT FEMUR



HISTORY:  LEFT FEMUR PAIN STATUS POST FALL.  



TECHNIQUE:  AP and lateral views of the left femur were obtained.  



Comparison:  None.  



Encounter:  Initial.



FINDINGS:  The bones and joints are normal in appearance.  There is no acute 
fracture or dislocation.  No focal soft tissue abnormality is identified.  



IMPRESSION:  

NEGATIVE LEFT FEMUR.  



JOB NUMBER:  [id number]
MTDD

## 2019-01-02 NOTE — RADIOLOGY REPORT
EXAM:  LEFT SHOULDER



HISTORY:  LEFT SHOULDER PAIN STATUS POST FALL.  



TECHNIQUE:  Three views of the left shoulder were obtained.  



Comparison:  None.  



FINDINGS:  The bones and joints are intact.  There is no acute fracture or 
dislocation.  There are minor arthritic changes of the acromioclavicular joint.
  



IMPRESSION:  

NO ACUTE LEFT SHOULDER PATHOLOGY.  



JOB NUMBER:  [id number]
MTDD

## 2019-01-02 NOTE — CT SCAN REPORT
EXAM:  CT SCAN OF THE BRAIN WITHOUT CONTRAST 



HISTORY:  SYNCOPE AND FALLS.  LEFT SIDED HEAD AND NECK PAIN.  



TECHNIQUE:  Standard CT imaging of the brain was performed in the axial plane 
without contrast.  



Comparison:  6/06/17.  



Encounter:  Initial.



FINDINGS:  The ventricles and subarachnoid spaces are normal.  There is no mass
, mass effect, intracranial hemorrhage, visible acute infarct, or abnormal 
extraaxial fluid.  The skull is intact.  The orbits, sinuses, and mastoids are 
normal.  



IMPRESSION:  

NO ACUTE INTRACRANIAL ABNORMALITY OR SKULL FRACTURE.  



JOB NUMBER:  [id number]
MTDD

## 2019-01-02 NOTE — RADIOLOGY REPORT
EXAM:  LEFT RIBS WITH PA CHEST



HISTORY:  LEFT RIB PAIN STATUS POST FALL.



TECHNIQUE:  An AP upright view of the chest and four views of the left ribs 
were obtained.  



Comparison:  Previous chest x-ray dated 9/20/18.  



FINDINGS:  The heart, mediastinum, and pulmonary vasculature are normal.  The 
lungs are clear.  There is no pneumothorax or effusion.  



The left ribs are normal in appearance.  There is no fracture or destructive 
process.  



IMPRESSION:  

NO ACUTE CHEST OR RIB PATHOLOGY IDENTIFIED.  



JOB NUMBER:  [id number]
[facility]

## 2019-01-02 NOTE — HISTORY & PHYSICAL
History of Present Illness





- Date of Service


Date of Service for History & Physical: 01/02/19





- History of Present Illness


Admitting Diagnosis: Syncope; multiple contusions lef shoulder, head, left leg


History of Present Illness: 


57 yo female c/o syncope x2. First episode was 12/24/18 when at home. Reports 

she was feeling dizzy and had her nephew assist her to the bathroom. Pt 

remembers splashing cold water on her face and then woke up on the bathroom 

floor. Was able to get her self off the floor and ambulate out of the bathroom, 

family assisted her to the bed. Pt reports vomiting when she got to bed, then 

laying down but did not sleep. Family later reported to her that she was not 

understanding parts of conversation. Pt was not taken to ER nor was EMS notified

, pt states everyone was intoxicated at the party but she was not drinking, was 

there for the food. Second episode was 1/1/19 with dizziness at home. Pt ate 

breakfast but still had dizziness. Pt is a poor historian and became very 

anxious during this interaction. PMH Hep C with recent tx completion, 

Hypothyroidism, Anx/Dep, COPD. 





1/1/19


Pt presented to ER for dizziness and episode of syncope. A&Ox3 upon arrival, 

denied CP, SOB, TAYE or ABD pain. 


/78, HR 86, RR18, 98% RA, 97.9F


WBC 6.6, Hgb 13.4, Hct 39.6, Plt 198


D Dimer 0.32


Tot Bili 0.40, Direct Bili <0.2, AST 23, ALT 18, Alk Phos 103, Trop <0.010, Tot 

protein 7.3, Albumin 3.8, lactic Acid 


EKG NSR, no acute issues


CT head and neck neg


Left shoulder, left femur XR neg, CXR neg





Admission for Syncope





1/2/19


Pt in no distress, a&ox3, neuro exam normal. Pt is poor historian but able to 

explain timeline well enough. US carotids ordered, repeat trops negative. Pt 

became anxious when speaking about recent family events of losing her mother 

and father has progressive CA. During this conversation, pt began to shake. 

Glucose 170 (postprandial), /85, HR 88, 97% RA. After stopping 

conversation about family stressors, pt calmed down and remained a&ox4. 


Labs reviewed and TSH ordered. TSH 0.01, T3T4 ordered. A1c 5.5


-Synthroid orders cancelled 


Awaiting Cardiology Consult and US results


POC if card clearance, PT eval and d/c tomorrow


F/U PCP to adjust synthroid








PCP Lupis Cortes ([place] [facility])                                               

                                                                               

                                                                               

                                                                               

                                                                               

                                                                               

                                                                               

                                                                               

                                                                               

                                                                               

                                                                               

                                                                               

                                                                               

                                                                               

                                                                               

                                                                               

                                                                               

                                                                               

                                                                               

                                                                               

                                                                               

                                                                               

                                                                               

                                                                               

                                                                               

                                                                               

                                                                               

                                                                               

                                                                               

                                                                               

                                                                               

                                                                               

                                                                               

                                                                               

                                                                               

                                                                               

                                                                               

                                                                               

                                                                               

                                                                               

                                                                               

                                                                               

                                                                               

                                                                               

                                                                               

                                                                               

                                                                               

                                                                               

                                                                               

                                                                               

                                                                               

                                                                               

                                                                               

                                                                               

                                                                               

                                                                               

                                                                               

                                                                               

                                                                               

                                                   





Travel Screening





- Travel/Exposure Within Last 30 Days


Have you traveled within the last 30 days?: No





- Travel/Exposure Within Last Year


Have you traveled outside the [place] in the last year?: No





- Additonal Travel Details


Have you been exposed to anyone with a communicable illness?: No





- Travel Symptoms


Symptom Screening: Lack of Appetite





Review of Systems


Constitutional: Reports: As per HPI.  Denies: Chills, Fever, Malaise, Night 

sweats, Weakness, Weight change


Eyes: Reports: As per HPI.  Denies: Eye discharge, Eye pain, Photophobia, 

Vision change


ENT: Reports: As per HPI.  Denies: Congestion, Dental pain, Ear pain, Epistaxis

, Hearing loss, Throat pain


Respiratory: Reports: As per HPI.  Denies: Cough, Dyspnea, Hemoptysis, Stridor, 

Wheezes


Cardiovascular: Reports: As per HPI.  Denies: Arrhythmia, Chest pain, Dyspnea 

on exertion, Edema, Murmurs, Orthopnea, Palpitations, Paroxysmal nocturnal 

dyspnea, Rheumatic Fever, Syncope


Endocrine: Reports: As per HPI.  Denies: Fatigue, Heat or cold intolerance, 

Polydipsia, Polyuria


Gastrointestinal: Reports: As per HPI, Constipation.  Denies: Abdominal pain, 

Diarrhea, Hematemesis, Hematochezia, Melena, Nausea, Vomiting


Genitourinary: Reports: As per HPI.  Denies: Abnormal menses, Discharge, 

Dyspareunia, Dysuria, Frequency, Hematuria, Incontinence, Retention, Urgency


Musculoskeletal: Reports: As per HPI.  Denies: Arthralgia, Back pain, Gout, 

Joint swelling, Myalgia, Neck pain


Skin: Reports: As per HPI, Bruising (since fall).  Denies: Change in color, 

Change in hair/nails, Lesions, Pruritus, Rash


Neurological: Reports: As per HPI.  Denies: Abnormal gait, Confusion, Headache, 

Numbness, Paresthesias, Seizure, Tingling, Tremors, Vertigo, Weakness


Psychiatric: Reports: As per HPI.  Denies: Anxiety, Auditory hallucinations, 

Depression, Homicidal thoughts, Suicidal thoughts, Visual hallucinations


Hematological/Lymphatic: Reports: As per HPI.  Denies: Anemia, Blood Clots, 

Easy bleeding, Easy bruising, Swollen glands





Past Medical History





- SOCIAL HISTORY


Smoking Status: Former smoker





- RESPIRATORY


Hx Respiratory Disorders: Yes


Hx COPD: Yes





- CARDIOVASCULAR


Hx Cardio Disorders: Yes


Hx Abnormal EKG: Yes


Comment:: low heart rate





- NEURO


Hx Neuro Disorders: Yes


Hx Headaches: Yes





- GI


Hx GI Disorders: Yes


Hx Hepatitis/Jaundice: Yes (Hx of hep C)





- 


Hx Genitourinary Disorders: No





- ENDOCRINE


Hx Endocrine Disorders: Yes


Hx Diabetes: No


Hx Thyroid Disease: Yes (removed 2010)





- MUSCULOSKELETAL


Hx Musculoskeletal Disorders: Yes


Hx Arthritis: Yes


Hx Osteoporosis: Yes





- PSYCH


Hx Psych Problems: Yes


Hx Anxiety: Yes


Hx Depression: Yes





- HEMATOLOGY/ONCOLOGY


Hx Hematology/Oncology Disorders: No





Family Medical History


Any Significant Family History?: Yes


Hx Cancer: Father


Hx Diabetes: Mother


Hx Liver Disease: Mother





H&P Meds/Allergies





- Allergies


Allergies: 


 Allergies











Allergy/AdvReac Type Severity Reaction Status Date / Time


 


aspirin Allergy Unknown PT UNSURE Verified 01/01/19 17:56





   OF REACTION  


 


Penicillins Allergy Unknown PT UNSURE Verified 01/01/19 17:56





   OF REACTION  














- Home Medications


 Home Medications











 Medication  Instructions  Recorded  Confirmed  Last Taken


 


Levothyroxine Sodium [Synthroid] 25 mcg PO DAILY 01/02/19 01/02/19 Unknown


 


Levothyroxine Sodium [Synthroid] 200 mcg PO DAILY 01/02/19 01/02/19 Unknown














- Active Medications


Active Medications: 


 Current Medications





Acetaminophen (Tylenol 325mg)  650 mg PO Q4H PRN


   PRN Reason: PAIN - MILD(1-4)/FEVER


   Last Admin: 01/02/19 08:32 Dose:  650 mg


Al Hydroxide/Mg Hydroxide (Maalox)  30 ml PO Q4H PRN


   PRN Reason: INDIGESTION


Albuterol Sulfate (Ventolin Hfa)  2 puff INH Q4H PRN


   PRN Reason: SHORTNESS OF BREATH


   Last Admin: 01/02/19 08:24 Dose:  2 puff


Temazepam (Restoril)  15 mg PO QHS PRN


   PRN Reason: INSOMNIA


   Last Admin: 01/01/19 22:26 Dose:  15 mg











Physical Exam





- Vital Signs


Vital Signs: 


 Vital Signs - Last 24 Hrs











  Temp Pulse Pulse Resp BP BP Pulse Ox


 


 01/02/19 09:06  97.9 F     94/55  


 


 01/02/19 08:20   72   16   


 


 01/02/19 05:45  97.9 F   66  14   94/55  97


 


 01/01/19 21:45  97.5 F L   69  16   138/78  97


 


 01/01/19 21:16    80  14   127/86  99


 


 01/01/19 17:47  97.9 F  86   18  121/78   98














- General


General Appearance: Alert, Oriented x3, Cooperative, No acute distress





- Head


Head exam: Normal inspection


Head exam detail: Contusion (diffusely over left side of head)





- Eye


Eye exam: Normal appearance, PERRL, EOMI.  negative: Conjunctival injection, 

Nystagmus


Pupils: Normal accommodation





- ENT


ENT exam: Normal exam, Mucous membranes moist, Normal external ear exam, Normal 

orophraynx, TM's normal bilaterally


Ear exam: Normal external inspection.  negative: External canal tenderness


Nasal Exam: Normal inspection.  negative: Discharge, Sinus tenderness


Mouth exam: Normal external inspection, Tongue normal


Teeth exam: Normal inspection.  negative: Dental caries


Throat exam: Normal inspection.  negative: Tonsillar erythema, Tonsillar exudate





- Neck


Neck exam: Normal inspection, Full ROM.  negative: Lymphadenopathy, Meningismus

, Tenderness





- Respiratory


Respiratory exam: Normal lung sounds bilaterally.  negative: Respiratory 

distress





- Cardiovascular


Cardiovascular Exam: Regular rate, Normal rhythm, Normal heart sounds


Peripheral Pulses: 2+: Radial (R), Radial (L), Dorsalis Pedis (R), Dorsalis 

Pedis (L)





- GI/Abdominal


GI/Abdominal exam: Soft, Normal bowel sounds.  negative: Tenderness





- Rectal


Rectal exam: Deferred





- 


 exam: Deferred





- Extremities


Extremities exam: Normal inspection, Full ROM, Normal capillary refill.  

negative: Calf tenderness, Pedal edema, Tenderness





- Back


Back exam: Reports: Normal inspection, Full ROM.  Denies: CVA tenderness (R), 

CVA tenderness (L), Muscle spasm, Rash noted, Tenderness





- Neurological


Neurological exam: Alert, CN II-XII intact, Normal gait, Oriented X3.  negative

: Abnormal gait, Altered, Motor sensory deficit





- Psychiatric


Psychiatric exam: Normal affect, Normal mood





- Skin


Skin exam: Dry, Intact, Normal color, Warm





Results





- Labs


Result Diagrams: 


 01/02/19 05:35





 01/02/19 05:35


Labs Last 24 Hours: 


 Laboratory Results - last 24 hr











  01/01/19 01/01/19 01/01/19





  17:10 18:40 18:40


 


WBC   6.6 


 


RBC   4.67 


 


Hgb   13.4 


 


Hct   39.6 


 


MCV   84.8 


 


MCH   28.7 


 


MCHC   33.8 


 


RDW   12.8 


 


Plt Count   198 


 


MPV   11.0 H 


 


Neutrophils %   37.0 L 


 


Eosinophils %   Not Reportable 


 


Basophils %   Not Reportable 


 


Lymphocytes   56.0 H 


 


Monocytes   6.0 


 


Platelet Estimate   Normal 


 


RBC Morphology   Normal 


 


Eosinophil Count   1.0 


 


D-Dimer    0.32


 


Sodium   


 


Potassium   


 


Chloride   


 


Carbon Dioxide   


 


Anion Gap   


 


BUN   


 


Creatinine   


 


Estimated GFR   


 


Random Glucose   


 


Hemoglobin A1c   


 


Lactic Acid  1.0  


 


Calcium   


 


Total Bilirubin   


 


Direct Bilirubin   


 


AST   


 


ALT   


 


Alkaline Phosphatase   


 


Troponin T   


 


Total Protein   


 


Albumin   


 


Globulin   


 


Albumin/Globulin Ratio   


 


TSH   


 


Thyroxine (T4)   


 


Urine Color   


 


Urine Appearance   


 


Urine pH   


 


Ur Specific Gravity   


 


Urine Protein   


 


Urine Glucose (UA)   


 


Urine Ketones   


 


Urine Blood   


 


Urine Nitrite   


 


Urine Bilirubin   


 


Urine Urobilinogen   


 


Ur Leukocyte Esterase   


 


Urine Opiates Screen   


 


Ur Oxycodone Screen   


 


Urine Methadone Screen   


 


Ur Propoxyphene Screen   


 


Ur Barbituates Screen   


 


Ur Tricyclics Screen   


 


Ur Phencyclidine Scrn   


 


Ur Amphetamine Screen   


 


U Methamphetamines Scrn   


 


U Benzodiazepines Scrn   


 


Urine Cocaine Screen   


 


Urine Cannabis Screen   














  01/01/19 01/01/19 01/01/19





  18:40 20:50 20:50


 


WBC   


 


RBC   


 


Hgb   


 


Hct   


 


MCV   


 


MCH   


 


MCHC   


 


RDW   


 


Plt Count   


 


MPV   


 


Neutrophils %   


 


Eosinophils %   


 


Basophils %   


 


Lymphocytes   


 


Monocytes   


 


Platelet Estimate   


 


RBC Morphology   


 


Eosinophil Count   


 


D-Dimer   


 


Sodium   


 


Potassium   


 


Chloride   


 


Carbon Dioxide   


 


Anion Gap   


 


BUN   


 


Creatinine   


 


Estimated GFR   


 


Random Glucose   


 


Hemoglobin A1c   


 


Lactic Acid  Cancelled  


 


Calcium   


 


Total Bilirubin  0.40  


 


Direct Bilirubin  < 0.2  


 


AST  23  


 


ALT  18  


 


Alkaline Phosphatase  103  


 


Troponin T   


 


Total Protein  7.3  


 


Albumin  3.8 L  


 


Globulin   


 


Albumin/Globulin Ratio   


 


TSH   


 


Thyroxine (T4)   


 


Urine Color    Yellow


 


Urine Appearance    Clear


 


Urine pH    6.0


 


Ur Specific Gravity    1.025


 


Urine Protein    Negative


 


Urine Glucose (UA)    Negative


 


Urine Ketones    Negative


 


Urine Blood    Negative


 


Urine Nitrite    Negative


 


Urine Bilirubin    Negative


 


Urine Urobilinogen    1.0


 


Ur Leukocyte Esterase    Negative


 


Urine Opiates Screen   Not detected 


 


Ur Oxycodone Screen   Not detected 


 


Urine Methadone Screen   Not detected 


 


Ur Propoxyphene Screen   Not detected 


 


Ur Barbituates Screen   Not detected 


 


Ur Tricyclics Screen   Not detected 


 


Ur Phencyclidine Scrn   Not detected 


 


Ur Amphetamine Screen   Not detected 


 


U Methamphetamines Scrn   Not detected 


 


U Benzodiazepines Scrn   Not detected 


 


Urine Cocaine Screen   Not detected 


 


Urine Cannabis Screen   Not detected 














  01/01/19 01/02/19 01/02/19





  22:40 05:35 05:35


 


WBC    5.1


 


RBC    4.50


 


Hgb    12.9


 


Hct    37.9


 


MCV    84.2


 


MCH    28.7


 


MCHC    34.0


 


RDW    12.5


 


Plt Count    181


 


MPV    10.3


 


Neutrophils %    25.0 L


 


Eosinophils %    Not Reportable


 


Basophils %    Not Reportable


 


Lymphocytes    64.0 H


 


Monocytes    11.0 H


 


Platelet Estimate    Normal


 


RBC Morphology    Normal


 


Eosinophil Count   


 


D-Dimer   


 


Sodium   


 


Potassium   


 


Chloride   


 


Carbon Dioxide   


 


Anion Gap   


 


BUN   


 


Creatinine   


 


Estimated GFR   


 


Random Glucose   


 


Hemoglobin A1c   


 


Lactic Acid   


 


Calcium   


 


Total Bilirubin   


 


Direct Bilirubin   


 


AST   


 


ALT   


 


Alkaline Phosphatase   


 


Troponin T  < 0.010  < 0.010 


 


Total Protein   


 


Albumin   


 


Globulin   


 


Albumin/Globulin Ratio   


 


TSH   


 


Thyroxine (T4)   


 


Urine Color   


 


Urine Appearance   


 


Urine pH   


 


Ur Specific Gravity   


 


Urine Protein   


 


Urine Glucose (UA)   


 


Urine Ketones   


 


Urine Blood   


 


Urine Nitrite   


 


Urine Bilirubin   


 


Urine Urobilinogen   


 


Ur Leukocyte Esterase   


 


Urine Opiates Screen   


 


Ur Oxycodone Screen   


 


Urine Methadone Screen   


 


Ur Propoxyphene Screen   


 


Ur Barbituates Screen   


 


Ur Tricyclics Screen   


 


Ur Phencyclidine Scrn   


 


Ur Amphetamine Screen   


 


U Methamphetamines Scrn   


 


U Benzodiazepines Scrn   


 


Urine Cocaine Screen   


 


Urine Cannabis Screen   














  01/02/19 01/02/19 01/02/19





  05:35 05:35 05:35


 


WBC   


 


RBC   


 


Hgb   


 


Hct   


 


MCV   


 


MCH   


 


MCHC   


 


RDW   


 


Plt Count   


 


MPV   


 


Neutrophils %   


 


Eosinophils %   


 


Basophils %   


 


Lymphocytes   


 


Monocytes   


 


Platelet Estimate   


 


RBC Morphology   


 


Eosinophil Count   


 


D-Dimer   


 


Sodium  143  


 


Potassium  3.6  


 


Chloride  108 H  


 


Carbon Dioxide  24.0  


 


Anion Gap  11.0  


 


BUN  13  


 


Creatinine  0.6  


 


Estimated GFR  > 60  


 


Random Glucose  109  


 


Hemoglobin A1c   


 


Lactic Acid   


 


Calcium  9.2  


 


Total Bilirubin  0.50  


 


Direct Bilirubin   


 


AST  20  


 


ALT  19  


 


Alkaline Phosphatase  91  


 


Troponin T   


 


Total Protein  6.6  


 


Albumin  3.4 L  


 


Globulin  3.2  


 


Albumin/Globulin Ratio  1.1  


 


TSH   0.01 L 


 


Thyroxine (T4)    18.00 H


 


Urine Color   


 


Urine Appearance   


 


Urine pH   


 


Ur Specific Gravity   


 


Urine Protein   


 


Urine Glucose (UA)   


 


Urine Ketones   


 


Urine Blood   


 


Urine Nitrite   


 


Urine Bilirubin   


 


Urine Urobilinogen   


 


Ur Leukocyte Esterase   


 


Urine Opiates Screen   


 


Ur Oxycodone Screen   


 


Urine Methadone Screen   


 


Ur Propoxyphene Screen   


 


Ur Barbituates Screen   


 


Ur Tricyclics Screen   


 


Ur Phencyclidine Scrn   


 


Ur Amphetamine Screen   


 


U Methamphetamines Scrn   


 


U Benzodiazepines Scrn   


 


Urine Cocaine Screen   


 


Urine Cannabis Screen   














  01/02/19





  05:35


 


WBC 


 


RBC 


 


Hgb 


 


Hct 


 


MCV 


 


MCH 


 


MCHC 


 


RDW 


 


Plt Count 


 


MPV 


 


Neutrophils % 


 


Eosinophils % 


 


Basophils % 


 


Lymphocytes 


 


Monocytes 


 


Platelet Estimate 


 


RBC Morphology 


 


Eosinophil Count 


 


D-Dimer 


 


Sodium 


 


Potassium 


 


Chloride 


 


Carbon Dioxide 


 


Anion Gap 


 


BUN 


 


Creatinine 


 


Estimated GFR 


 


Random Glucose 


 


Hemoglobin A1c  5.50


 


Lactic Acid 


 


Calcium 


 


Total Bilirubin 


 


Direct Bilirubin 


 


AST 


 


ALT 


 


Alkaline Phosphatase 


 


Troponin T 


 


Total Protein 


 


Albumin 


 


Globulin 


 


Albumin/Globulin Ratio 


 


TSH 


 


Thyroxine (T4) 


 


Urine Color 


 


Urine Appearance 


 


Urine pH 


 


Ur Specific Gravity 


 


Urine Protein 


 


Urine Glucose (UA) 


 


Urine Ketones 


 


Urine Blood 


 


Urine Nitrite 


 


Urine Bilirubin 


 


Urine Urobilinogen 


 


Ur Leukocyte Esterase 


 


Urine Opiates Screen 


 


Ur Oxycodone Screen 


 


Urine Methadone Screen 


 


Ur Propoxyphene Screen 


 


Ur Barbituates Screen 


 


Ur Tricyclics Screen 


 


Ur Phencyclidine Scrn 


 


Ur Amphetamine Screen 


 


U Methamphetamines Scrn 


 


U Benzodiazepines Scrn 


 


Urine Cocaine Screen 


 


Urine Cannabis Screen 














- Imaging and Cardiology


  ** CT scan - head


Status: Report reviewed





  ** Chest x-ray


Status: Report reviewed





VTE H&P Assessment





- Risk for VTE


Risk for VTE: Yes


Risk Level: Moderate


Risk Assessment Date: 01/02/19


Risk Assessment Time: 10:46


VTE Orders Placed or Will Be Placed: Yes





Plan





- Inpatient Certification


Inpatient Certification: 


Admit to inpatient care: Based on my medical assessment, after consideration of 

patient's risk factors (age, co-morbidities and patient presenting symptoms and 

acuity), I expect that this patient will remain in the hospital greater than or 

equal to two midnights and that the services needed warrant inpatient care 

because:





Patient Risk Factors: Fall risk, syncope





Estimated length of stay:   The patient may reasonably be expected to be 

discharged or transferred to a hospital within 96 hours after admission to 

[facility].





Services needed: Repeat labs, tele, PT eval, cardiology consult, carotid US





Post hospital care (if known): []





I certify that my determination is in accordance with my understanding of 

Medicare requirements for reasonable and necessary inpatient services.





01/02/19 10:46








- Detailed Diagnosis and Plan


(1) Syncope


Current Visit: Yes   Status: Acute   Base Code: R55 - SYNCOPE AND COLLAPSE   

Comment: 1/2/19


-EKG neg, trop neg, tele normal


-Ortho static VS pending


-Carotid US pending


- TSH abnomral, holding synthroid at this time


-Cardilogy Consult pending


-Fall risk, up with assist   





(2) Hypothyroid


Current Visit: Yes   Status: Acute   Base Code: E03.9 - HYPOTHYROIDISM, 

UNSPECIFIED   Comment: 1/2/19


-TSH 0.01, holding Synthroid, will give 150mcg at D/C until seen by PCP for 

chronic mgt


-pt has similar issues 2010 with thryroid issues were first identified   





(3) DVT prophylaxis


Current Visit: Yes   Status: Acute   Base Code: [id number] -    Comment: 1/2/19


-Lovenox 40mg SQ QD   





(4) Full code status


Current Visit: Yes   Status: Acute   Base Code: Z78.9 - OTHER SPECIFIED HEALTH 

STATUS   Comment: 1/2/19


-Full Code Status

## 2019-01-03 LAB
ANION GAP SERPL CALC-SCNC: 10 MMOL/L (ref 7–16)
BASOPHILS NFR BLD: 0.2 % (ref 0–6)
BUN SERPL-MCNC: 16 MG/DL (ref 8–23)
CO2 SERPL-SCNC: 25 MMOL/L (ref 22–29)
CREAT SERPL-MCNC: 0.7 MG/DL (ref 0.5–0.9)
EOSINOPHIL NFR BLD: 1.3 % (ref 0–6)
ERYTHROCYTE [DISTWIDTH] IN BLOOD BY AUTOMATED COUNT: 12.3 % (ref 11.5–14.5)
EST GLOMERULAR FILTRATION RATE: > 60 ML/MIN
GLUCOSE SERPL-MCNC: 115 MG/DL (ref 74–109)
GRANULOCYTES NFR BLD: 43.1 % (ref 47–80)
HCT VFR BLD CALC: 36.3 % (ref 35–47)
HGB BLD-MCNC: 12.4 GM/DL (ref 11.6–16)
LYMPHOCYTES NFR BLD AUTO: 45.6 % (ref 16–45)
MCH RBC QN AUTO: 29 PG (ref 27–33)
MCHC RBC AUTO-ENTMCNC: 34.2 G/DL (ref 32–36)
MCV RBC AUTO: 85 FL (ref 81–97)
MONOCYTES NFR BLD: 9.8 % (ref 0–9)
PLATELET # BLD: 164 K/UL (ref 130–400)
PMV BLD AUTO: 10.5 FL (ref 7.4–10.4)
RBC # BLD AUTO: 4.27 M/UL (ref 3.8–5.4)
WBC # BLD AUTO: 4.7 K/UL (ref 4.2–12.2)

## 2019-01-03 RX ADMIN — ENOXAPARIN SODIUM SCH MG: 40 INJECTION SUBCUTANEOUS at 10:26

## 2019-01-03 RX ADMIN — ACETAMINOPHEN PRN MG: 325 TABLET, FILM COATED ORAL at 06:56

## 2019-01-03 RX ADMIN — DIPHENHYDRAMINE HYDROCHLORIDE PRN MG: 25 CAPSULE ORAL at 10:25

## 2019-01-03 NOTE — REHAB EVALUATION
Patient Information





- Patient Information


Diagnosis: Syncope


Ordered Treatment: PT Evaluate and Treat


Status: Initial Evaluation


Surgery: No


History: Detail (Pt presented to ED 19 having experienced two recent 

episodes of syncope, first on 18 and again 19; admitted to [facility] 

for further medical workup.  Experienced head, shoulder, and LE contusions in 

falls related to syncope.)


Past Medical/Surgical Hx: 


 PAST MEDICAL/SURGICAL HISTORY





Past Surgical History             x2


                                 Thyroid removed





PMH - Respiratory





Hx Respiratory Disorders         Yes


Hx Chronic Obstructive           Yes


Pulmonary Disease (COPD)         





PMH - Cardiovascular





Hx Cardiovascular Disorders      Yes


Hx Abnormal EKG                  Yes


Comment:                         low heart rate





PMH - Neuro





Hx Neurological Disorders        Yes


Hx Headaches                     Yes





PMH - GI





Hx Gastrointestinal Disorders    Yes


Hx Hepatitis/Jaundice            Yes: Hx of hep C





PMH - 





Hx Genitourinary Disorders       No


Patient Pregnant                 No





PMH - Endocrine





Hx Endocrine Disorders           Yes


Hx Diabetes                      No


Hx Thyroid Disease               Yes: removed 





PMH - Musculoskeletal





Hx Musculoskeletal Disorders     Yes


Hx Arthritis                     Yes


Hx Osteoporosis                  Yes





PMH - Psych





Hx Psychiatric Problems          Yes


Hx Anxiety                       Yes


Hx Depression                    Yes





PMH - Hematology/Oncology





Hx Hematology/Oncology           No


Disorders                        








Premorbid Status: Detail (Pt was independent with all ADLs, including driving, 

but has help from family for housekeeping.  She states she used a single tip 

cane for ambulation due to problems with her left leg feeling like it might 

give out, but she recently gave it to her elderly father.)


Social History: Detail (Pt currently lives in a second-level apartment with tub/

shower combination w/no grab bars.  She is moving this week to a lower level 

apartment, with assistance from her grandchildren.  She states she stays quite 

active with her grandchildren.)


Precautions: Universal, Fall





- Time With Patient


Total Time Spent With Patient (Min): 35


Treatment Procedures: Detail (PT Evaluation)





Subjective Information





- Subjective Information


Per Patient (Pt reclined in bed upon arrival; awake, alert, cooperative for 

therapy.  She notes diffuse pain in left leg that is chronic.)





Objective Data





- Pain


Pain Present: Yes


Pain Intensity: 5


Pain Scale Used: Numeric (1 - 10)





- Mental Status


Patient Orientation: Oriented x3





- Visual Perception


Appears within normal limits for therapeutic activities





- ROM


Within normal limits





- Strength/Tone


Not within normal limits (L hip flexion, extension, L knee flexion, extension, 

and L ankle dorsiflexion grossly 4-/5 with minimal effort exerted.  R hip 

abduction and extension are 4/5, R hip flexion and extension are 4+/5 as well 

as R knee flexion, extension, and R ankle dorsiflexion.)





- Coordination


Appears within normal limits for therapeutic activities





- Bed Mobility


Independent





- Transfers


Independent





- Balance


Balance Sitting: Good


Balance Standing: Good (Completed Howard Balance test, scored 54/56; challenged 

with tandem stance and single limb stance.)





- Sensation


Intact





- Gait


Detail (Ambulated w/o assistive device w/CGA/SBA from bedside to [facility] 

waiting area and back to bedside (about 390 feet).  Denied fatigue, shortness 

of breath, increased pain.)





Therapy Assessment





- Therapy Assessment


Detail (Pt exhibits safety with gait and balance for return to home 

environment.  She was encouraged to obtain single tip cane to encourage 

mobility and facilitate safety.  She verbalized good understanding.  She has no 

further needs for physical therapy at this time.)





Patient Education





- Patient Education


Teaching Topic: Equipment Use, Exercise/Activity, Precautions


Response: Verbalize Understanding


Teaching Method: Discussion


Teaching Recipient: Patient


Barriers To Learning: None





Problem List





- Problem List


Physical Therapy Problem List: Detail (General L LE weakness of unknown origin.)





Goals





- Goals


Physical Therapy Goals: None identified.





Prognosis





- Prognosis


Good





Plan





- Plan


Physical Therapy Plan: Pt exhibits safe gait and balance to return to home 

environment.  She should obtain single tip cane to encourage mobility and 

facilitate safety.  She is discharged from PT at this time.

## 2019-01-03 NOTE — DISCHARGE SUMMARY
Providers


Discharge Summary Date: 01/03/19


Date of admission: 


01/01/19 21:36





Expected Date of Discharge: 01/03/19


Attending physician: 


TYLER HODGES





Primary care physician: 


ROBERT FRIAS D.O.





Consults: 


Consult Orders





01/01/19 21:45


Consult - Cardiology NOW 


   Consulting Provider: LOUISE GONG


   Physician Instructions: 


   Reason For Exam: syncope


   Does pt have current cardiologist?: Alycia


   Comment: unknown














Physical Exam





- Vital Signs


Vital Signs: 


 Vital Signs - Last 24 Hrs











  Temp Pulse Pulse Resp BP BP Pulse Ox


 


 01/03/19 08:14    85  18   


 


 01/03/19 05:00  97.9 F   91 H  16   103/67  98


 


 01/02/19 22:33   80   16   


 


 01/02/19 21:00  97.7 F   78  16   106/56  93 L


 


 01/02/19 17:40  98.0 F   91 H  16   99/59  99


 


 01/02/19 13:44  97.7 F   75  16   118/57  100


 


 01/02/19 09:06  97.9 F     94/55  


 


 01/02/19 09:00  97.5 F L   88  16   120/85  96














- General


General Appearance: Alert, Oriented x3, Cooperative, No acute distress





- Head


Head exam: Normal inspection


Head exam detail: Contusion (diffusely over left side of head)





- Eye


Eye exam: Normal appearance, PERRL, EOMI.  negative: Conjunctival injection, 

Nystagmus


Pupils: Normal accommodation





- ENT


ENT exam: Normal exam, Mucous membranes moist, Normal external ear exam, Normal 

orophraynx, TM's normal bilaterally


Ear exam: Normal external inspection.  negative: External canal tenderness


Nasal Exam: Normal inspection.  negative: Discharge, Sinus tenderness


Mouth exam: Normal external inspection, Tongue normal


Teeth exam: Normal inspection.  negative: Dental caries


Throat exam: Normal inspection.  negative: Tonsillar erythema, Tonsillar exudate





- Neck


Neck exam: Normal inspection, Full ROM.  negative: Lymphadenopathy, Meningismus

, Tenderness





- Respiratory


Respiratory exam: Normal lung sounds bilaterally.  negative: Respiratory 

distress





- Cardiovascular


Cardiovascular Exam: Regular rate, Normal rhythm, Normal heart sounds


Peripheral Pulses: 2+: Radial (R), Radial (L), Dorsalis Pedis (R), Dorsalis 

Pedis (L)





- GI/Abdominal


GI/Abdominal exam: Soft, Normal bowel sounds.  negative: Tenderness





- Rectal


Rectal exam: Deferred





- 


 exam: Deferred





- Extremities


Extremities exam: Normal inspection, Full ROM, Normal capillary refill.  

negative: Calf tenderness, Pedal edema, Tenderness





- Back


Back exam: Reports: Normal inspection, Full ROM.  Denies: CVA tenderness (R), 

CVA tenderness (L), Muscle spasm, Rash noted, Tenderness





- Neurological


Neurological exam: Alert, CN II-XII intact, Normal gait, Oriented X3.  negative

: Abnormal gait, Altered, Motor sensory deficit





- Psychiatric


Psychiatric exam: Normal affect, Normal mood





- Skin


Skin exam: Dry, Intact, Normal color, Warm





Hospitalization





- Hospitalization


Admission Diagnosis: Syncope; multiple contusions lef shoulder, head, left leg





- Problem List/Discharge Diagnosis


(1) Syncope


Current Visit: Yes   Status: Acute   Base Code: R55 - SYNCOPE AND COLLAPSE   

Comment: 1/2/19


-EKG neg, trop neg, tele normal


-Ortho static VS pending


-Carotid US pending


- TSH abnomral, holding synthroid at this time


-Cardilogy Consult pending


-Fall risk, up with assist


1/3/19


-asymptomatic this AM


-Dr Gong conulted yesterday, ECHO ordered, completed and normal


-Carotid US neg (less than 50% stenosis), incidently found 1 lymphnode


-tele normal, trops normal


-PT eval to be completed before d/c   





(2) Hypothyroid


Current Visit: Yes   Status: Acute   Base Code: E03.9 - HYPOTHYROIDISM, 

UNSPECIFIED   Comment: 1/2/19


-TSH 0.01, holding Synthroid, will give 150mcg at D/C until seen by PCP for 

chronic mgt


-pt has similar issues 2010 with thryroid issues were first identified


1/3/19


-no Synthroid today, will send home with 1 week of 150mcg until seen by PCP 

tuesday for f/u   





(3) DVT prophylaxis


Current Visit: Yes   Status: Acute   Base Code: [id number] -    Comment: 1/3/19


-Lovenox 40mg SQ QD   





(4) Full code status


Current Visit: Yes   Status: Acute   Base Code: Z78.9 - OTHER SPECIFIED HEALTH 

STATUS   Comment: 1/3/19


-Full Code Status   





- Disposition


Discharge home, needs follow up with PCP to have synthroid adjusted as current 

dosing was too high. Cardiac clearance completed by Dr Madala. 





STOP synthroid 225mcg and take only the 150mcg given today. 





Return to ER for continued syncope, chest pain, difficulty breathing or 

shortness of breath. 








- Hospitalization Course


Disposition: Home, Self-Care


Hospital Course: 


57 yo female c/o syncope x2. First episode was 12/24/18 when at home. Reports 

she was feeling dizzy and had her nephew assist her to the bathroom. Pt 

remembers splashing cold water on her face and then woke up on the bathroom 

floor. Was able to get her self off the floor and ambulate out of the bathroom, 

family assisted her to the bed. Pt reports vomiting when she got to bed, then 

laying down but did not sleep. Family later reported to her that she was not 

understanding parts of conversation. Pt was not taken to ER nor was EMS notified

, pt states everyone was intoxicated at the party but she was not drinking, was 

there for the food. Second episode was 1/1/19 with dizziness at home. Pt ate 

breakfast but still had dizziness. Pt is a poor historian and became very 

anxious during this interaction. PMH Hep C with recent tx completion, 

Hypothyroidism, Anx/Dep, COPD. 





1/1/19


Pt presented to ER for dizziness and episode of syncope. A&Ox3 upon arrival, 

denied CP, SOB, TAYE or ABD pain. 


/78, HR 86, RR18, 98% RA, 97.9F


WBC 6.6, Hgb 13.4, Hct 39.6, Plt 198


D Dimer 0.32


Tot Bili 0.40, Direct Bili <0.2, AST 23, ALT 18, Alk Phos 103, Trop <0.010, Tot 

protein 7.3, Albumin 3.8, lactic Acid 


EKG NSR, no acute issues


CT head and neck neg


Left shoulder, left femur XR neg, CXR neg





Admission for Syncope





1/2/19


Pt in no distress, a&ox3, neuro exam normal. Pt is poor historian but able to 

explain timeline well enough. US carotids ordered, repeat trops negative. Pt 

became anxious when speaking about recent family events of losing her mother 

and father has progressive CA. During this conversation, pt began to shake. 

Glucose 170 (postprandial), /85, HR 88, 97% RA. After stopping 

conversation about family stressors, pt calmed down and remained a&ox4. 


Labs reviewed and TSH ordered. TSH 0.01, T3T4 ordered. A1c 5.5


-Synthroid orders cancelled 


Awaiting Cardiology Consult and US results


POC if card clearance, PT eval and d/c tomorrow


F/U PCP to adjust synthroid








[facility]                                               

                                                                               

                                                                               

                                                                               

                                                                               

                                                                               

                                                                               

                                                                               

                                                                               

                                                                               

                                                                               

                                                                               

                                                                               

                                                                               

                                                                               

                                                                               

                                                                               

                                                                               

                                                                               

                                                                               

                                                                               

                                                                               

                                                                               

                                                                               

                                                                               

                                                                               

                                                                               

                                                                               

                                                                               

                                                                               

                                                                               

                                                                               

                                                                               

                                                                               

                                                                               

                                                                               

                                                                               

                                                                               

                                                                               

                                                                               

                                                                               

                                                                               

                                                                               

                                                                               

                                                                               

                                                                               

                                                                               

                                                                               

                                                                               

                                                                               

                                                                               

                                                                               

                                                                               

                                                                               

                                                                               

                                                                               

                                                                               

                                                                               

                                                                               

                                                   


Procedures: 


Imaging and X-Rays





01/01/19 18:19


CERVICAL SPINE WO CONTRAST [CT] Stat 


FEMUR, LEFT [RAD] Stat 


HEAD WO CONTRAST [CT] Stat 


RIBS, LEFT W/PA CHEST [RAD] Stat 


SHOULDER, LEFT [RAD] Stat 





01/02/19 08:49


ARTERIAL DOPPLER CAROTID LESLEY [US] Stat 








Cardiology Procedures





01/01/19 18:19


EKG NOW 





01/01/19 21:45


Cardiac Monitor .Continuous 





01/02/19 13:03


Echo W/CF & Cardiac Doppler NOW 











Abnormal Labs: 


 Abnormal Lab Results











  01/01/19 01/01/19 01/02/19 Range/Units





  18:40 18:40 05:35 


 


MPV  11.0 H    (7.4-10.4)  fl


 


Gran %     (47-80)  %


 


Neutrophils %  37.0 L   25.0 L  (47-80)  %


 


Lymphocytes %     (16-45)  %


 


Monocytes %     (0-9)  %


 


Lymphocytes  56.0 H   64.0 H  (16-45)  %


 


Monocytes    11.0 H  (0-9)  %


 


Chloride     ()  mmol/L


 


POC Glucose     ()  mg/dL


 


Random Glucose     ()  mg/dL


 


Albumin   3.8 L   (4.0-5.0)  g/dL


 


TSH     (0.270-4.20)  uIU/mL


 


Thyroxine (T4)     (4.5-11.7)  ug/dL














  01/02/19 01/02/19 01/02/19 Range/Units





  05:35 05:35 05:35 


 


MPV     (7.4-10.4)  fl


 


Gran %     (47-80)  %


 


Neutrophils %     (47-80)  %


 


Lymphocytes %     (16-45)  %


 


Monocytes %     (0-9)  %


 


Lymphocytes     (16-45)  %


 


Monocytes     (0-9)  %


 


Chloride  108 H    ()  mmol/L


 


POC Glucose     ()  mg/dL


 


Random Glucose     ()  mg/dL


 


Albumin  3.4 L    (4.0-5.0)  g/dL


 


TSH   0.01 L   (0.270-4.20)  uIU/mL


 


Thyroxine (T4)    18.00 H  (4.5-11.7)  ug/dL














  01/02/19 01/03/19 01/03/19 Range/Units





  10:05 06:20 06:20 


 


MPV   10.5 H   (7.4-10.4)  fl


 


Gran %   43.1 L   (47-80)  %


 


Neutrophils %     (47-80)  %


 


Lymphocytes %   45.6 H   (16-45)  %


 


Monocytes %   9.8 H   (0-9)  %


 


Lymphocytes     (16-45)  %


 


Monocytes     (0-9)  %


 


Chloride     ()  mmol/L


 


POC Glucose  170 H    ()  mg/dL


 


Random Glucose    115 H  ()  mg/dL


 


Albumin     (4.0-5.0)  g/dL


 


TSH     (0.270-4.20)  uIU/mL


 


Thyroxine (T4)     (4.5-11.7)  ug/dL











Condition at Discharge: (2) Stable





Discharge Medications





- Discharge Medications


Home Medications: 


 Ambulatory Orders





Acetaminophen [Tylenol 325Mg] 650 mg PO Q4H PRN  tablet 01/03/19 [Last Taken 

Unknown]


Albuterol Sulfate [Ventolin Hfa] 2 puff INH Q4H PRN  inhaler 01/03/19 [Last 

Taken Unknown]


Docusate Sodium [Colace] 100 mg PO BID PRN  cap 01/03/19 [Last Taken Unknown]


Levothyroxine Sodium [Synthroid] 150 mcg PO DAILY #14 tab 01/03/19 [Last Taken 

Unknown]


Magnesium Hydroxide/Al Hydrox [Maalox] 30 ml PO Q4H PRN  oral.susp 01/03/19 [

Last Taken Unknown]











Discharge Plan





- Discharge Instructions


Activity at Discharge: Increase Activity as Tolerated


Diet at Discharge: Regular Diet


Additional Instructions: 


Follow up with your primary doctor, Dr. Frias, on Tuesday 1/8 at 10AM.





Quality Measures





- Quality Measures


Quality Measures: Documentation of Current Medications in Medical Record, 

Screening for High Blood Pressure and F/U Documented





- Current Medications


Quality Measure: Measure #130: Documentation of Current Medications


Documentation of Current Medications: <Current Medications Documented/Reviewed> 

[]





- Blood Pressure Screening


Quality Measure: Screening for High Blood Pressure and Follow-Up Documented


Does Patient Have Any of the Following: No


Blood Pressure Classification: Normal BP Reading


Systolic Measurement: 94


Diastolic Measurement: 55


Screening for High Blood Pressure: < Normal BP, F/U Not Required > []





- Elder Abuse Suspicion Index


EASI Reference Information: Dhara SOW, Zacarias C, Mirna BEARD, [place] RIOS.Development and validation of a tool to assist physicians identification of 

elder abuse: The Elder Abuse Suspicion  Index (EASI ).  Journal of Elder Abuse 

and Neglect, 2008; 20 (3): 276-300.

## 2019-01-03 NOTE — US CAROTID DOPPLER REPORT
EXAM:  BILATERAL CAROTID DOPPLER ULTRASOUND



HISTORY:  SYNCOPE, DIZZINESS.  



TECHNIQUE:  Routine bilateral carotid arterial Doppler ultrasound was performed.



Comparison:  None.  



FINDINGS:  



 3





                Vessel             Right 

      Peak Systolic/

End Diastolic Velocities             Left

     Peak Systolic/

End Diastolic Velocities

 

Proximal Common Carotid Artery 87 cm/s /22 cm/s 86 cm/s /31 cm/s

 

Mid Common Carotid Artery 76 cm/s /26 cm/s 83 cm/s /24 cm/s

 

Distal Common Carotid Artery 68 cm/s /22 cm/s 68 cm/s /21 cm/s

 

Proximal Internal Carotid Artery 61 cm/s /19 cm/s 66 cm/s /26 cm/s

 

Mid Internal Carotid Artery 67 cm/s /22 cm/s 60 cm/s /19 cm/s

 

Distal Internal Carotid Artery 76 cm/s /36 cm/s 112 cm/s /37 cm/s

 

Carotid Bulb 53 cm/s /17 cm/s 60 cm/s /18 cm/s

 

Proximal External Carotid Artery 87 cm/s /11 cm/s 86 cm/s /11 cm/s

  d

 d  d 

 

         Right Flow          Left Flow

 

Vertebral Artery 33 cm/s/Antegrade 54 cm/s/Antegrade





Right ICA/CCA ratio is 1.0.  Left ICA/CCA ratio is 1.3.  



Small calcified plaques noted near the carotid bulbs bilaterally.  



Incidental note of a rounded hypoechoic nodule measuring 8 x 8 x 10 mm adjacent 
to the right external carotid artery.  



IMPRESSION:  

1.  BILATERAL CAROTID ATHEROSCLEROSIS WITH FINDINGS SUGGESTIVE OF LESS THAN 50% 
STENOSIS BILATERALLY.  



2.  ANTEGRADE FLOW IN BOTH VERTEBRAL ARTERIES.  



3.  INCIDENTALLY NOTED ROUNDED HYPOECHOIC NODULE MEASURING UP TO 10 MM ADJACENT 
TO THE RIGHT EXTERNAL CAROTID ARTERY.  FINDINGS INDETERMINATE, BUT MAY 
REPRESENT A LYMPH NODE.  



JOB NUMBER: [id number]

[facility]

## 2019-02-23 ENCOUNTER — HOSPITAL ENCOUNTER (EMERGENCY)
Dept: HOSPITAL 59 - ER | Age: 59
Discharge: HOME | End: 2019-02-23
Payer: MEDICARE

## 2019-02-23 DIAGNOSIS — R51: Primary | ICD-10-CM

## 2019-02-23 DIAGNOSIS — H53.149: ICD-10-CM

## 2019-02-23 DIAGNOSIS — J44.9: ICD-10-CM

## 2019-02-23 DIAGNOSIS — Z87.891: ICD-10-CM

## 2019-02-23 PROCEDURE — 96375 TX/PRO/DX INJ NEW DRUG ADDON: CPT

## 2019-02-23 PROCEDURE — 70450 CT HEAD/BRAIN W/O DYE: CPT

## 2019-02-23 PROCEDURE — 96374 THER/PROPH/DIAG INJ IV PUSH: CPT

## 2019-02-23 PROCEDURE — 99284 EMERGENCY DEPT VISIT MOD MDM: CPT
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
## 2019-02-25 NOTE — CT SCAN REPORT
EXAM:  CT SCAN OF THE HEAD WITHOUT CONTRAST 



HISTORY:  HEADACHES FOR THE PAST THREE WEEKS.  PATIENT FELL AND HIT HEAD ON [facility] 
[facility].  



TECHNIQUE:  Standard CT imaging of the head was performed in the axial plane 
without contrast.  



Comparison:  1/01/19.  



FINDINGS:  The ventricles and subarachnoid spaces are normal.  The brain 
parenchyma is unremarkable.  There is no mass, mass effect, intracranial 
hemorrhage, visible acute infarct, or abnormal extraaxial fluid.  The skull is 
intact.  The orbits are normal.  There is minor chronic mucosal thickening 
within the paranasal sinuses.  There is trace fluid within the right mastoid 
air cells.  



IMPRESSION:  

1.  NO ACUTE INTRACRANIAL ABNORMALITY OR SKULL FRACTURE.  



2.  MINOR CHRONIC MUCOSAL THICKENING WITHIN THE PARANASAL SINUSES.  



3.  TRACE NONSPECIFIC FLUID WITHIN THE RIGHT MASTOID AIR CELLS.  



JOB NUMBER:  [id number]
[facility]

## 2019-11-26 ENCOUNTER — HOSPITAL ENCOUNTER (EMERGENCY)
Dept: HOSPITAL 59 - ER | Age: 59
Discharge: LEFT BEFORE DISCHARGE | End: 2019-11-26
Payer: MEDICARE

## 2019-11-26 DIAGNOSIS — Z87.891: ICD-10-CM

## 2019-11-26 DIAGNOSIS — R51: ICD-10-CM

## 2019-11-26 DIAGNOSIS — Z53.29: ICD-10-CM

## 2019-11-26 DIAGNOSIS — J44.9: ICD-10-CM

## 2019-11-26 DIAGNOSIS — R11.0: ICD-10-CM

## 2019-11-26 DIAGNOSIS — R20.2: ICD-10-CM

## 2019-11-26 DIAGNOSIS — R07.89: Primary | ICD-10-CM

## 2019-11-26 LAB
ABSOLUTE NEUTROPHIL COUNT: (no result)
ALBUMIN SERPL-MCNC: 4.5 G/DL (ref 4–5)
ALBUMIN/GLOB SERPL: 1.4 {RATIO} (ref 1.1–1.8)
ALP SERPL-CCNC: 132 U/L (ref 35–104)
ALT SERPL-CCNC: 15 U/L (ref ?–33)
ANION GAP SERPL CALC-SCNC: 11 MMOL/L (ref 7–16)
AST SERPL-CCNC: 24 U/L (ref 10–35)
BILIRUB SERPL-MCNC: 0.3 MG/DL (ref 0.2–1)
BUN SERPL-MCNC: 9 MG/DL (ref 8–23)
CO2 SERPL-SCNC: 25 MMOL/L (ref 22–29)
CREAT SERPL-MCNC: 0.7 MG/DL (ref 0.5–0.9)
EOSINOPHIL NFR BLD: 1 % (ref 0–6)
ERYTHROCYTE [DISTWIDTH] IN BLOOD BY AUTOMATED COUNT: 13.8 % (ref 11.5–14.5)
EST GLOMERULAR FILTRATION RATE: > 60 ML/MIN
GLOBULIN SER-MCNC: 3.2 GM/DL (ref 1.4–4.8)
GLUCOSE SERPL-MCNC: 140 MG/DL (ref 74–109)
HCT VFR BLD CALC: 42.7 % (ref 35–47)
HGB BLD-MCNC: 14.2 GM/DL (ref 11.6–16)
LYMPHOCYTES NFR BLD: 51 % (ref 16–45)
MCH RBC QN AUTO: 28.7 PG (ref 27–33)
MCHC RBC AUTO-ENTMCNC: 33.3 G/DL (ref 32–36)
MCV RBC AUTO: 86.3 FL (ref 81–97)
MONOCYTES NFR BLD: 9 % (ref 0–9)
NEUTROPHILS NFR BLD AUTO: 39 % (ref 47–80)
PLATELET # BLD: 197 K/UL (ref 130–400)
PMV BLD AUTO: 10.4 FL (ref 7.4–10.4)
PROT SERPL-MCNC: 7.7 G/DL (ref 6.6–8.7)
RBC # BLD AUTO: 4.95 M/UL (ref 3.8–5.4)
WBC # BLD AUTO: 6.9 K/UL (ref 4.2–12.2)

## 2019-11-26 PROCEDURE — 71046 X-RAY EXAM CHEST 2 VIEWS: CPT

## 2019-11-26 PROCEDURE — 93010 ELECTROCARDIOGRAM REPORT: CPT

## 2019-11-26 PROCEDURE — 84484 ASSAY OF TROPONIN QUANT: CPT

## 2019-11-26 PROCEDURE — 80053 COMPREHEN METABOLIC PANEL: CPT

## 2019-11-26 PROCEDURE — 93005 ELECTROCARDIOGRAM TRACING: CPT

## 2019-11-26 PROCEDURE — 85027 COMPLETE CBC AUTOMATED: CPT

## 2019-11-26 PROCEDURE — 99285 EMERGENCY DEPT VISIT HI MDM: CPT

## 2019-11-26 RX ADMIN — ASPIRIN 81 MG CHEWABLE TABLET ONE MG: 81 TABLET CHEWABLE at 21:22

## 2019-11-26 RX ADMIN — NITROGLYCERIN ONE: 0.4 TABLET SUBLINGUAL at 22:06

## 2019-11-26 RX ADMIN — SODIUM CHLORIDE SCH MLS/HR: 0.9 INJECTION, SOLUTION INTRAVENOUS at 22:10

## 2019-11-26 NOTE — RADIOLOGY REPORT
EXAMINATION: Two View Chest Radiographs

EXAM DATE:  11/26/2019 10:24 PM



TECHNIQUE:  Frontal and lateral views



INDICATION:  chest pain

COMPARISON:  None



ENCOUNTER: Not applicable

_________________________



FINDINGS:



The heart, mediastinum, and pulmonary vasculature are normal.   No lung consolidation or pleural effu
sions are present.

_________________________



IMPRESSION:



No acute pulmonary disease process



Dictated by: Caitlin Garcia DO on 11/26/2019 10:28 PM.

Electronically signed by: Caitlin Garcia DO on 11/26/2019 10:29 PM.
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
